# Patient Record
Sex: FEMALE | Race: WHITE | NOT HISPANIC OR LATINO | Employment: OTHER | URBAN - METROPOLITAN AREA
[De-identification: names, ages, dates, MRNs, and addresses within clinical notes are randomized per-mention and may not be internally consistent; named-entity substitution may affect disease eponyms.]

---

## 2017-06-01 ENCOUNTER — GENERIC CONVERSION - ENCOUNTER (OUTPATIENT)
Dept: OTHER | Facility: OTHER | Age: 82
End: 2017-06-01

## 2017-06-01 ENCOUNTER — APPOINTMENT (EMERGENCY)
Dept: RADIOLOGY | Facility: HOSPITAL | Age: 82
End: 2017-06-01
Payer: COMMERCIAL

## 2017-06-01 ENCOUNTER — HOSPITAL ENCOUNTER (EMERGENCY)
Facility: HOSPITAL | Age: 82
Discharge: HOME | End: 2017-06-01
Attending: EMERGENCY MEDICINE | Admitting: EMERGENCY MEDICINE
Payer: COMMERCIAL

## 2017-06-01 VITALS
HEIGHT: 65 IN | WEIGHT: 159.61 LBS | TEMPERATURE: 97.6 F | OXYGEN SATURATION: 96 % | DIASTOLIC BLOOD PRESSURE: 71 MMHG | HEART RATE: 80 BPM | RESPIRATION RATE: 14 BRPM | SYSTOLIC BLOOD PRESSURE: 149 MMHG | BODY MASS INDEX: 26.59 KG/M2

## 2017-06-01 DIAGNOSIS — R00.2 RAPID PALPITATIONS: Primary | ICD-10-CM

## 2017-06-01 DIAGNOSIS — R07.89 CHEST DISCOMFORT: ICD-10-CM

## 2017-06-01 LAB
ANION GAP SERPL CALCULATED.3IONS-SCNC: 8 MMOL/L (ref 4–13)
ATRIAL RATE: 88 BPM
BASOPHILS # BLD AUTO: 0.04 THOUSANDS/ΜL (ref 0–0.1)
BASOPHILS NFR BLD AUTO: 1 % (ref 0–1)
BUN SERPL-MCNC: 18 MG/DL (ref 5–25)
CALCIUM SERPL-MCNC: 10.5 MG/DL (ref 8.3–10.1)
CHLORIDE SERPL-SCNC: 107 MMOL/L (ref 100–108)
CLARITY, POC: CLEAR
CO2 SERPL-SCNC: 27 MMOL/L (ref 21–32)
COLOR, POC: YELLOW
CREAT SERPL-MCNC: 1.12 MG/DL (ref 0.6–1.3)
EOSINOPHIL # BLD AUTO: 0.1 THOUSAND/ΜL (ref 0–0.61)
EOSINOPHIL NFR BLD AUTO: 1 % (ref 0–6)
ERYTHROCYTE [DISTWIDTH] IN BLOOD BY AUTOMATED COUNT: 13.2 % (ref 11.6–15.1)
EXT BILIRUBIN, UA: NEGATIVE
EXT BLOOD URINE: NEGATIVE
EXT GLUCOSE, UA: NEGATIVE
EXT KETONES: NEGATIVE
EXT NITRITE, UA: NEGATIVE
EXT PH, UA: 6.5
EXT PROTEIN, UA: NEGATIVE
EXT SPECIFIC GRAVITY, UA: 1.01
EXT UROBILINOGEN: 0.2
GFR SERPL CREATININE-BSD FRML MDRD: 46.9 ML/MIN/1.73SQ M
GLUCOSE SERPL-MCNC: 99 MG/DL (ref 65–140)
HCT VFR BLD AUTO: 43.2 % (ref 34.8–46.1)
HGB BLD-MCNC: 14.9 G/DL (ref 11.5–15.4)
LYMPHOCYTES # BLD AUTO: 1.82 THOUSANDS/ΜL (ref 0.6–4.47)
LYMPHOCYTES NFR BLD AUTO: 24 % (ref 14–44)
MAGNESIUM SERPL-MCNC: 2.1 MG/DL (ref 1.6–2.6)
MCH RBC QN AUTO: 31.7 PG (ref 26.8–34.3)
MCHC RBC AUTO-ENTMCNC: 34.5 G/DL (ref 31.4–37.4)
MCV RBC AUTO: 92 FL (ref 82–98)
MONOCYTES # BLD AUTO: 0.41 THOUSAND/ΜL (ref 0.17–1.22)
MONOCYTES NFR BLD AUTO: 5 % (ref 4–12)
NEUTROPHILS # BLD AUTO: 5.21 THOUSANDS/ΜL (ref 1.85–7.62)
NEUTS SEG NFR BLD AUTO: 69 % (ref 43–75)
P AXIS: 61 DEGREES
PLATELET # BLD AUTO: 247 THOUSANDS/UL (ref 149–390)
PMV BLD AUTO: 10.5 FL (ref 8.9–12.7)
POTASSIUM SERPL-SCNC: 4.3 MMOL/L (ref 3.5–5.3)
PR INTERVAL: 156 MS
QRS AXIS: -40 DEGREES
QRSD INTERVAL: 76 MS
QT INTERVAL: 336 MS
QTC INTERVAL: 395 MS
RBC # BLD AUTO: 4.7 MILLION/UL (ref 3.81–5.12)
SODIUM SERPL-SCNC: 142 MMOL/L (ref 136–145)
T WAVE AXIS: 68 DEGREES
TROPONIN I SERPL-MCNC: <0.02 NG/ML
TROPONIN I SERPL-MCNC: <0.02 NG/ML
TSH SERPL DL<=0.05 MIU/L-ACNC: 1.59 UIU/ML (ref 0.36–3.74)
VENTRICULAR RATE: 83 BPM
WBC # BLD AUTO: 7.58 THOUSAND/UL (ref 4.31–10.16)
WBC # BLD EST: NEGATIVE 10*3/UL

## 2017-06-01 PROCEDURE — 96360 HYDRATION IV INFUSION INIT: CPT

## 2017-06-01 PROCEDURE — 93005 ELECTROCARDIOGRAM TRACING: CPT | Performed by: EMERGENCY MEDICINE

## 2017-06-01 PROCEDURE — 85025 COMPLETE CBC W/AUTO DIFF WBC: CPT | Performed by: EMERGENCY MEDICINE

## 2017-06-01 PROCEDURE — 84443 ASSAY THYROID STIM HORMONE: CPT | Performed by: EMERGENCY MEDICINE

## 2017-06-01 PROCEDURE — 71020 HB CHEST X-RAY 2VW FRONTAL&LATL: CPT

## 2017-06-01 PROCEDURE — 80048 BASIC METABOLIC PNL TOTAL CA: CPT | Performed by: EMERGENCY MEDICINE

## 2017-06-01 PROCEDURE — 81002 URINALYSIS NONAUTO W/O SCOPE: CPT | Performed by: EMERGENCY MEDICINE

## 2017-06-01 PROCEDURE — 36415 COLL VENOUS BLD VENIPUNCTURE: CPT | Performed by: EMERGENCY MEDICINE

## 2017-06-01 PROCEDURE — 83735 ASSAY OF MAGNESIUM: CPT | Performed by: EMERGENCY MEDICINE

## 2017-06-01 PROCEDURE — 96361 HYDRATE IV INFUSION ADD-ON: CPT

## 2017-06-01 PROCEDURE — 99285 EMERGENCY DEPT VISIT HI MDM: CPT

## 2017-06-01 PROCEDURE — 84484 ASSAY OF TROPONIN QUANT: CPT | Performed by: EMERGENCY MEDICINE

## 2017-06-01 PROCEDURE — 93005 ELECTROCARDIOGRAM TRACING: CPT

## 2017-06-01 RX ORDER — CARVEDILOL 3.12 MG/1
3.12 TABLET ORAL 2 TIMES DAILY WITH MEALS
COMMUNITY
End: 2017-12-21

## 2017-06-01 RX ORDER — DIPHENOXYLATE HYDROCHLORIDE AND ATROPINE SULFATE 2.5; .025 MG/1; MG/1
1 TABLET ORAL DAILY
COMMUNITY
End: 2017-12-21

## 2017-06-01 RX ORDER — LISINOPRIL 5 MG/1
5 TABLET ORAL DAILY
COMMUNITY

## 2017-06-01 RX ADMIN — SODIUM CHLORIDE 1000 ML: 0.9 INJECTION, SOLUTION INTRAVENOUS at 16:51

## 2017-06-01 RX ADMIN — SODIUM CHLORIDE 1000 ML: 0.9 INJECTION, SOLUTION INTRAVENOUS at 15:30

## 2017-06-02 LAB
ATRIAL RATE: 70 BPM
ATRIAL RATE: 72 BPM
P AXIS: 60 DEGREES
P AXIS: 66 DEGREES
PR INTERVAL: 160 MS
PR INTERVAL: 176 MS
QRS AXIS: -27 DEGREES
QRS AXIS: -28 DEGREES
QRSD INTERVAL: 74 MS
QRSD INTERVAL: 76 MS
QT INTERVAL: 370 MS
QT INTERVAL: 370 MS
QTC INTERVAL: 399 MS
QTC INTERVAL: 405 MS
T WAVE AXIS: 40 DEGREES
T WAVE AXIS: 42 DEGREES
VENTRICULAR RATE: 70 BPM
VENTRICULAR RATE: 72 BPM

## 2017-06-05 ENCOUNTER — HOSPITAL ENCOUNTER (OUTPATIENT)
Dept: NUCLEAR MEDICINE | Facility: HOSPITAL | Age: 82
Discharge: HOME | End: 2017-06-05
Attending: EMERGENCY MEDICINE
Payer: COMMERCIAL

## 2017-06-05 ENCOUNTER — TRANSCRIBE ORDERS (OUTPATIENT)
Dept: ADMINISTRATIVE | Facility: HOSPITAL | Age: 82
End: 2017-06-05

## 2017-06-05 ENCOUNTER — HOSPITAL ENCOUNTER (OUTPATIENT)
Dept: NON INVASIVE DIAGNOSTICS | Facility: HOSPITAL | Age: 82
Discharge: HOME | End: 2017-06-05
Attending: EMERGENCY MEDICINE
Payer: COMMERCIAL

## 2017-06-05 DIAGNOSIS — R00.2 RAPID PALPITATIONS: ICD-10-CM

## 2017-06-05 DIAGNOSIS — R07.89 CHEST DISCOMFORT: ICD-10-CM

## 2017-06-05 LAB
CHEST PAIN STATEMENT: NORMAL
MAX DIASTOLIC BP: 84 MMHG
MAX HEART RATE: 181 BPM
MAX PREDICTED HEART RATE: 141 BPM
MAX. SYSTOLIC BP: 184 MMHG
PROTOCOL NAME: NORMAL
TARGET HR FORMULA: NORMAL
TEST INDICATION: NORMAL
TIME IN EXERCISE PHASE: 206 S

## 2017-06-05 PROCEDURE — 93226 XTRNL ECG REC<48 HR SCAN A/R: CPT

## 2017-06-05 PROCEDURE — 78452 HT MUSCLE IMAGE SPECT MULT: CPT

## 2017-06-05 PROCEDURE — A9502 TC99M TETROFOSMIN: HCPCS

## 2017-06-05 PROCEDURE — 93225 XTRNL ECG REC<48 HRS REC: CPT

## 2017-06-05 PROCEDURE — 93017 CV STRESS TEST TRACING ONLY: CPT

## 2017-06-09 ENCOUNTER — ALLSCRIPTS OFFICE VISIT (OUTPATIENT)
Dept: OTHER | Facility: OTHER | Age: 82
End: 2017-06-09

## 2017-07-18 ENCOUNTER — HOSPITAL ENCOUNTER (OUTPATIENT)
Dept: RADIOLOGY | Facility: HOSPITAL | Age: 82
Discharge: HOME | End: 2017-07-18
Attending: ORTHOPAEDIC SURGERY
Payer: COMMERCIAL

## 2017-07-18 ENCOUNTER — ALLSCRIPTS OFFICE VISIT (OUTPATIENT)
Dept: OTHER | Facility: OTHER | Age: 82
End: 2017-07-18

## 2017-07-18 DIAGNOSIS — M25.562 PAIN IN LEFT KNEE: ICD-10-CM

## 2017-07-18 PROCEDURE — 73562 X-RAY EXAM OF KNEE 3: CPT

## 2017-10-06 ENCOUNTER — GENERIC CONVERSION - ENCOUNTER (OUTPATIENT)
Dept: OTHER | Facility: OTHER | Age: 82
End: 2017-10-06

## 2017-10-17 ENCOUNTER — GENERIC CONVERSION - ENCOUNTER (OUTPATIENT)
Dept: OTHER | Facility: OTHER | Age: 82
End: 2017-10-17

## 2017-12-21 ENCOUNTER — APPOINTMENT (EMERGENCY)
Dept: RADIOLOGY | Facility: HOSPITAL | Age: 82
End: 2017-12-21
Payer: COMMERCIAL

## 2017-12-21 ENCOUNTER — HOSPITAL ENCOUNTER (EMERGENCY)
Facility: HOSPITAL | Age: 82
Discharge: HOME | End: 2017-12-21
Attending: EMERGENCY MEDICINE
Payer: COMMERCIAL

## 2017-12-21 VITALS
HEART RATE: 94 BPM | DIASTOLIC BLOOD PRESSURE: 85 MMHG | WEIGHT: 161.4 LBS | BODY MASS INDEX: 26.86 KG/M2 | RESPIRATION RATE: 20 BRPM | OXYGEN SATURATION: 97 % | TEMPERATURE: 98.6 F | SYSTOLIC BLOOD PRESSURE: 156 MMHG

## 2017-12-21 DIAGNOSIS — M25.569 CHRONIC KNEE PAIN: Primary | ICD-10-CM

## 2017-12-21 DIAGNOSIS — G89.29 CHRONIC KNEE PAIN: Primary | ICD-10-CM

## 2017-12-21 PROCEDURE — 73564 X-RAY EXAM KNEE 4 OR MORE: CPT

## 2017-12-21 PROCEDURE — 99283 EMERGENCY DEPT VISIT LOW MDM: CPT

## 2017-12-21 RX ORDER — HYDROCODONE BITARTRATE AND ACETAMINOPHEN 5; 325 MG/1; MG/1
1 TABLET ORAL EVERY 6 HOURS PRN
Qty: 15 TABLET | Refills: 0 | Status: SHIPPED | OUTPATIENT
Start: 2017-12-21 | End: 2018-04-11 | Stop reason: ALTCHOICE

## 2017-12-21 RX ORDER — METOPROLOL SUCCINATE 25 MG/1
1 TABLET, EXTENDED RELEASE ORAL DAILY
COMMUNITY
Start: 2017-06-09 | End: 2018-06-03 | Stop reason: SDUPTHER

## 2017-12-21 RX ORDER — HYDROCODONE BITARTRATE AND ACETAMINOPHEN 5; 325 MG/1; MG/1
1 TABLET ORAL ONCE
Status: DISCONTINUED | OUTPATIENT
Start: 2017-12-21 | End: 2017-12-21 | Stop reason: HOSPADM

## 2017-12-21 NOTE — ED PROVIDER NOTES
History  Chief Complaint   Patient presents with    Knee Pain     c/o left knee pain/swelling s/p "my knee just went out while I was walking" x 1 day  Pt recieved Cortisone Shot in left knee 17  Patient presents to the emergency department for evaluation of left knee pain which is chronic in nature  She knows that she ultimately will need a new knee replacement 1 day but has not obtained this  She has been getting cortisone injections her last was   Yesterday while walking in the house she tweaked her knee but did not fall to the ground  She has had an exacerbation of discomfort the anterior portion  She is having difficulty bearing weight  She denies any other injury at this time  She denies having taken analgesics  Prior to Admission Medications   Prescriptions Last Dose Informant Patient Reported? Taking?   lisinopril (ZESTRIL) 5 mg tablet   Yes Yes   Sig: Take 5 mg by mouth daily     metoprolol succinate (TOPROL-XL) 25 mg 24 hr tablet   Yes Yes   Sig: Take 1 tablet by mouth daily      Facility-Administered Medications: None       Past Medical History:   Diagnosis Date    Cardiac disease     Hypertension     MI, old        Past Surgical History:   Procedure Laterality Date     SECTION      TONSILLECTOMY      TOTAL KNEE ARTHROPLASTY Right        History reviewed  No pertinent family history  I have reviewed and agree with the history as documented  Social History   Substance Use Topics    Smoking status: Never Smoker    Smokeless tobacco: Never Used    Alcohol use No        Review of Systems   Constitutional: Negative  HENT: Negative  Eyes: Negative  Respiratory: Negative  Cardiovascular: Negative  Gastrointestinal: Negative  Endocrine: Negative  Genitourinary: Negative  Musculoskeletal: Positive for arthralgias and gait problem  Skin: Negative  Allergic/Immunologic: Negative  Hematological: Negative  Psychiatric/Behavioral: Negative  Physical Exam  ED Triage Vitals [12/21/17 0945]   Temperature Pulse Respirations Blood Pressure SpO2   98 6 °F (37 °C) 94 20 156/85 97 %      Temp Source Heart Rate Source Patient Position - Orthostatic VS BP Location FiO2 (%)   Oral Monitor Sitting Left arm --      Pain Score       7           Orthostatic Vital Signs  Vitals:    12/21/17 0945   BP: 156/85   Pulse: 94   Patient Position - Orthostatic VS: Sitting       Physical Exam   Constitutional: She is oriented to person, place, and time  She appears well-developed and well-nourished  Musculoskeletal: She exhibits tenderness  She exhibits no edema or deformity  Grossly normal appearing knee with mild swelling  No obvious deformity  No joint laxity or obvious joint effusion  Patient has normal range of motion but is decreased in speed  Neurovascular status intact  There is tenderness to the anterior portion of the knee  Neurological: She is alert and oriented to person, place, and time  She displays normal reflexes  No cranial nerve deficit or sensory deficit  She exhibits normal muscle tone  Coordination normal    Skin: Skin is warm and dry  No rash noted  Psychiatric: She has a normal mood and affect  Her behavior is normal  Judgment and thought content normal    Nursing note and vitals reviewed  ED Medications  Medications   HYDROcodone-acetaminophen (NORCO) 5-325 mg per tablet 1 tablet (1 tablet Oral Not Given 12/21/17 1029)       Diagnostic Studies  Results Reviewed     None                 XR knee 4+ views left injury   Final Result by Emily Rowe MD (12/21 1026)      Chondrocalcinosis and left knee arthritis  Findings suggest CPPD arthropathy  Trace joint effusion    No acute osseous abnormality         Workstation performed: [id number]                    Procedures  Procedures       Phone Contacts  ED Phone Contact    ED Course  ED Course as of Dec 21 1034   Thu Dec 21, 2017   1026 Patient is stable for discharge without acute pathology or fracture in the knee  Will fit her with a mobilizer at a walker and refer to her private orthopedist   Vicodin given as well as a script  [facility]  CritCare Time    Disposition  Final diagnoses:   Chronic knee pain     Time reflects when diagnosis was documented in both MDM as applicable and the Disposition within this note     Time User Action Codes Description Comment    12/21/2017 10:27 AM Ryan Kathleen Sola [[id number],  G89 29] Chronic knee pain       ED Disposition     ED Disposition Condition Comment    Discharge  Vane Dioxn discharge to home/self care  Condition at discharge: Stable        Follow-up Information     Follow up With Specialties Details Why Contact Info    Private Orthopedist  Schedule an appointment as soon as possible for a visit          Patient's Medications   Discharge Prescriptions    HYDROCODONE-ACETAMINOPHEN (NORCO) 5-325 MG PER TABLET    Take 1 tablet by mouth every 6 (six) hours as needed for pain Max Daily Amount: 4 tablets       Start Date: 12/21/2017End Date: --       Order Dose: 1 tablet       Quantity: 15 tablet    Refills: 0     No discharge procedures on file      ED Provider  Electronically Signed by           Shelia Macdonald MD  12/21/17 2711

## 2017-12-21 NOTE — DISCHARGE INSTRUCTIONS
Arthralgia   WHAT YOU NEED TO KNOW:   Arthralgia is pain in one or more joints, with no inflammation  It may be short-term and get better within 6 to 8 weeks  Arthralgia can be an early sign of arthritis  Arthralgia may be caused by a medical condition, such as a hormone disorder or a tumor  It may also be caused by an infection or injury  DISCHARGE INSTRUCTIONS:   Medicines: The following medicines may  be ordered for you:  · Acetaminophen  decreases pain  Ask how much to take and how often to take it  Follow directions  Acetaminophen can cause liver damage if not taken correctly  · NSAIDs  decrease pain and prevent swelling  Ask your healthcare provider which medicine is right for you  Ask how much to take and when to take it  Take as directed  NSAIDs can cause stomach bleeding and kidney problems if not taken correctly  · Pain relief cream  decreases pain  Use this cream as directed  · Take your medicine as directed  Contact your healthcare provider if you think your medicine is not helping or if you have side effects  Tell him of her if you are allergic to any medicine  Keep a list of the medicines, vitamins, and herbs you take  Include the amounts, and when and why you take them  Bring the list or the pill bottles to follow-up visits  Carry your medicine list with you in case of an emergency  Follow up with your healthcare provider or specialist as directed:  Write down your questions so you remember to ask them during your visits  Self-care:   · Apply heat  to help decrease pain  Use a heating pad or heat wrap  Apply heat for 20 to 30 minutes every 2 hours for as many days as directed  · Rest  as much as possible  Avoid activities that cause joint pain  · Apply ice  to help decrease swelling and pain  Ice may also help prevent tissue damage  Use an ice pack, or put crushed ice in a plastic bag   Cover it with a towel and place it on your painful joint for 15 to 20 minutes every hour or as directed  · Support  the joint with a brace or elastic wrap as directed  · Elevate  your joint above the level of your heart as often as you can to help decrease swelling and pain  Prop your painful joint on pillows or blankets to keep it elevated comfortably  · Lose weight  if you are overweight  Extra weight can put pressure on your joints and cause more pain  Ask your healthcare provider how much you should weigh  Ask him to help you create a weight loss plan  · Exercise  regularly to help improve joint movement and to decrease pain  Ask about the best exercise plan for you  Low-impact exercises can help take the pressure off your joints  Examples are walking, swimming, and water aerobics  Physical therapy:  A physical therapist teaches you exercises to help improve movement and strength, and to decrease pain  Ask your healthcare provider if physical therapy is right for you  Contact your healthcare provider or specialist if:   · You have a fever  · You continue to have joint pain that cannot be relieved with heat, ice, or medicine  · You have pain and inflammation around your joint  · You have questions or concerns about your condition or care  Return to the emergency department if:   · You have sudden, severe pain when you move your joint  · You have a fever and shaking chills  · You cannot move your joint  · You lose feeling on the side of your body where you have the painful joint  © 2017 [address]  Information is for End User's use only and may not be sold, redistributed or otherwise used for commercial purposes  All illustrations and images included in CareNotes® are the copyrighted property of [employer]  or [employer]  The above information is an  only  It is not intended as medical advice for individual conditions or treatments   Talk to your doctor, nurse or pharmacist before following any medical regimen to see if it is safe and effective for you  Knee Pain   WHAT YOU NEED TO KNOW:   Knee pain may start suddenly, or it may be a long-term problem  You may have pain on the side, front, or back of your knee  You may have knee stiffness and swelling  You may hear popping sounds or feel like your knee is giving way or locking up as you walk  You may feel pain when you sit, stand, walk, or climb up and down stairs  Knee pain can be caused by conditions such as obesity, inflammation, or strains or tears in ligaments or tendons  DISCHARGE INSTRUCTIONS:   Follow up with your healthcare provider within 24 hours or as directed: You may need follow-up treatments, such as steroid injections to decrease pain  Write down your questions so you remember to ask them during your visits  Self-care:   · Rest  your knee so it can heal  Limit activities that increase your pain  · Ice  can help reduce swelling  Wrap ice in a towel and put it on your knee for as long and as often as directed  · Compression  with a brace or bandage can help reduce swelling  Use a brace or bandage only as directed  · Elevation  helps decrease pain and swelling  Elevate your knee while you are sitting or lying down  Prop your leg on pillows to keep your knee above the level of your heart  Medicines:   · NSAIDs  help decrease swelling and pain or fever  This medicine is available with or without a doctor's order  NSAIDs can cause stomach bleeding or kidney problems in certain people  If you take blood thinner medicine, always ask your healthcare provider if NSAIDs are safe for you  Always read the medicine label and follow directions  · Acetaminophen  decreases pain and fever  It is available without a doctor's order  Ask how much to take and when to take it  Follow directions  Acetaminophen can cause liver damage if not taken correctly  · Take your medicine as directed    Contact your healthcare provider if you think your medicine is not helping or if you have side effects  Tell him or her if you are allergic to any medicine  Keep a list of the medicines, vitamins, and herbs you take  Include the amounts, and when and why you take them  Bring the list or the pill bottles to follow-up visits  Carry your medicine list with you in case of an emergency  Exercise as directed: You may need to see a physical therapist or do recommended exercises to improve movement and decrease your pain  You may be directed to walk, swim, or ride a bike  Follow your exercise plan exactly as directed to avoid further injury  Contact your healthcare provider if:   · You have questions or concerns about your condition or care  Return to the emergency department if:   · Your pain is worse, even after treatment  · You cannot bend or straighten your leg completely  · The swelling around your knee does not go down even with treatment  · Your knee is painful and hot to the touch  © 2017 [address] Information is for End User's use only and may not be sold, redistributed or otherwise used for commercial purposes  All illustrations and images included in CareNotes® are the copyrighted property of [employer]  or [employer]  The above information is an  only  It is not intended as medical advice for individual conditions or treatments  Talk to your doctor, nurse or pharmacist before following any medical regimen to see if it is safe and effective for you  Chronic Pain   WHAT YOU NEED TO KNOW:   Chronic pain is pain that does not get better for 3 months or longer  Chronic pain may hurt all the time, or come and go  DISCHARGE INSTRUCTIONS:   Call 911 or have someone call 911 for any of the following:   · You are breathing slower than normal, or you have trouble breathing  · You cannot be awakened  · You have a seizure    Return to the emergency department if:   · Your heart is beating slower than normal     · Your heart feels like it is jumping or fluttering  · You cannot think clearly  Contact your healthcare provider if:   · You have side effects from prescription pain medicine, such as itching, nausea, or vomiting  · You have trouble sleeping  · Your pain gets worse, even after you take medicine  · You don't think the medicine is working  · You have questions or concerns about your condition or care  Medicines: You may need any of the following:  · Acetaminophen  decreases pain  It is available without a doctor's order  Ask how much to take and how often to take it  Follow directions  Read the labels of all other medicines you are using to see if they also contain acetaminophen, or ask your doctor or pharmacist  Acetaminophen can cause liver damage if not taken correctly  Do not use more than 4 grams (4,000 milligrams) total of acetaminophen in one day  · NSAIDs , such as ibuprofen, help decrease swelling, pain, and fever  This medicine is available with or without a doctor's order  NSAIDs can cause stomach bleeding or kidney problems in certain people  If you take blood thinner medicine, always ask your healthcare provider if NSAIDs are safe for you  Always read the medicine label and follow directions  · Prescription pain medicine  called narcotics or opioids may be given  Ask your healthcare provider how to take this medicine safely  · Anesthetics  can be rubbed on your skin or injected into a nerve or muscle to numb an area  · Other medicines  may reduce pain, anxiety, muscle tension, or swelling  · Take your medicine as directed  Contact your healthcare provider if you think your medicine is not helping or if you have side effects  Tell him of her if you are allergic to any medicine  Keep a list of the medicines, vitamins, and herbs you take  Include the amounts, and when and why you take them  Bring the list or the pill bottles to follow-up visits   Carry your medicine list with you in case of an emergency  Manage your chronic pain:   · Apply heat  on the area in pain for 20 to 30 minutes every 2 hours for as many days as directed  Heat helps decrease pain and muscle spasms  · Apply ice  on the part of your body that hurts for 15 to 20 minutes every hour or as directed  Use an ice pack, or put crushed ice in a plastic bag  Cover it with a towel  Ice decreases pain and swelling, and helps prevent tissue damage  · Go to physical therapy as directed  A physical therapist teaches you exercises to help improve movement and strength, and to decrease pain  · Exercise for 30 minutes, 3 times a week  Regular physical activity can help decrease pain and improve your quality of life  Ask your healthcare provider about the best exercise plan for your type of pain  · Get enough sleep  Create a relaxing bedtime routine  Go to sleep and wake up at the same time every day  Avoid caffeine in the afternoon  · Talk with a counselor or therapist   A type of counseling called cognitive behavioral therapy (CBT) can help your chronic pain by changing the way you think about it  CBT can also improve your mood, sleep, and ability to move  What you must know if you take narcotic pain medicine:   · You may need to take a bowel movement softener  The most common side effect of prescription pain medicine is constipation  Bowel movement softeners are available over the counter  · Do not mix prescription pain medicines  This can cause an overdose of medicine, which can become life-threatening  Read labels  Make sure you know the ingredients in all of your medicines  · Do not drink alcohol  when you take prescription pain medicine  It is not safe to mix narcotics or opioids with alcohol or illegal drugs  · Prescription pain medicine may impair your ability to drive or work safely  They may also cause dizziness and increase your risk for falling       · Store prescription pain medicine in a safe location at home  Keep your medicine away from children and other people  Never share your medicine with anyone  Follow up with your healthcare provider as directed: You may be referred to a pain specialist  Write down your questions so you remember to ask them during your visits  © 2017 [address] Information is for End User's use only and may not be sold, redistributed or otherwise used for commercial purposes  All illustrations and images included in CareNotes® are the copyrighted property of [employer]  or [employer]  The above information is an  only  It is not intended as medical advice for individual conditions or treatments  Talk to your doctor, nurse or pharmacist before following any medical regimen to see if it is safe and effective for you

## 2018-01-08 ENCOUNTER — ALLSCRIPTS OFFICE VISIT (OUTPATIENT)
Dept: OTHER | Facility: OTHER | Age: 83
End: 2018-01-08

## 2018-01-09 NOTE — PROGRESS NOTES
Assessment   Assessed    1  PSVT (paroxysmal supraventricular tachycardia) (427 0) (I47 1)   2  Arthritis of left knee (716 96) (M17 12)    Plan   Arthritis of left knee    · Follow-up visit in 1 year Evaluation and Treatment  Follow-up  Status: Hold For -    Scheduling  Requested for: [id number]   Ordered; For: Arthritis of left knee; Ordered By: Ankit Lovell Performed:  Due: [id number]    Discussion/Summary   Cardiology Discussion Summary Free Text Note Form [facility]:    82F history of stress induced CMP  EF normalized  Off lisinopril    in the past  On metoprolol and stable  no contraindication to doing orthopedic surgery  doesnât have a date or any firm plans yet, just wanted to make sure it was ok  need to return if clearance necessary  review EKG if done preop testing  beta blocker  up 1 year  Chief Complaint   Chief Complaint Free Text Note Form: Patient is here for a follow up and would like to know if i she is in a stable condition to have left knee replacement  History of Present Illness   Cardiology HPI Free Text Note Form [facility]: [de-identified]year old female  She had symptoms of palpitations, had PSVT noted on stress test  monitor 24 hours without significant arrhythmia  of stress induced CMP in setting of emotional stress  EF normalize  d was running low, so lisinopril was stopped  recent palpitations  to have knee surgery  Wanted to make sure it was ok with regards to cardiac standpoint before scheduling  palpitations recently, no chest pain  Review of Systems   Cardiology Female ROS:         Cardiac: palpitations present , but-- no chest pain  Skin: No complaints of nonhealing sores or skin rash  Genitourinary: No complaints of recurrent urinary tract infections, frequent urination at night, difficult urination, blood in urine, kidney stones, loss of bladder control, kidney problems, denies any birth control or hormone replacement, is not post menopausal, not currently pregnant  Psychological: No complaints of feeling depressed, anxiety, panic attacks, or difficulty concentrating  General: No complaints of trouble sleeping, lack of energy, fatigue, appetite changes, weight changes, fever, frequent infections, or night sweats  Respiratory: No complaints of shortness of breath, cough with sputum, or wheezing  HEENT: No complaints of serious problems, hearing problems, nose problems, throat problems, or snoring  Gastrointestinal: No complaints of liver problems, nausea, vomiting, heartburn, constipation, bloody stools, diarrhea, problems swallowing, adbominal pain, or rectal bleeding  Hematologic: No complaints of bleeding disorders, anemia, blood clots, or excessive brusing  Neurological: No complaints of numbness, tingling, dizziness, weakness, seizures, headaches, syncope or fainting, AM fatigue, daytime sleepiness, no witnessed apnea episodes  Musculoskeletal: No complaints of arthritis, back pain, or painfull swelling  ROS Reviewed:    ROS reviewed  Active Problems   Problems    1  Arthritis of left knee (716 96) (M17 12)   2  Chest discomfort (786 59) (R07 89)   3  Hypercholesterolemia (272 0) (E78 00)   4  Left knee pain (719 46) (M25 562)   5  Palpitations (785 1) (R00 2)   6  PSVT (paroxysmal supraventricular tachycardia) (427 0) (I47 1)    Past Medical History   Problems    1  History of cardiac cath (V45 89) (Y56 012)   2  History of chest pain (V13 89) (Z87 898)   3  History of myocardial infarction (412) (I25 2)  Active Problems And Past Medical History Reviewed: The active problems and past medical history were reviewed and updated today  Family History   Mother    1  No pertinent family history  Father    2  No pertinent family history  Family History Reviewed: The family history was reviewed and updated today  Social History   Problems    · Non-smoker (V49 89) (Z78 9)  Social History Reviewed:  The social history was reviewed and is unchanged  Current Meds    1  Adult Aspirin Low Strength 81 MG TBDP; Therapy: (Recorded:09Jun2017) to Recorded   2  Daily Multi Oral Tablet; Therapy: (Recorded:09Jun2017) to Recorded   3  Metoprolol Succinate ER 25 MG Oral Tablet Extended Release 24 Hour; TAKE 1 TABLET     ONCE DAILY; Therapy: [id number] to (Evaluate:04Jun2018)  Requested for: [id number]; Last     Rx:09Jun2017 Ordered  Medication List Reviewed: The medication list was reviewed and updated today  Allergies   Medication    1  No Known Drug Allergies    Vitals   Vital Signs    Recorded: [id number] 01:04PM   Heart Rate 93   Systolic 684, LUE, Sitting   Diastolic 84, LUE, Sitting   Height 5 ft 5 in   Weight 161 lb    BMI Calculated 26 79   BSA Calculated 1 8   O2 Saturation 97     Physical Exam        Constitutional - General appearance: No acute distress, well appearing and well nourished  Eyes - Conjunctiva and Sclera examination: Conjunctiva pink, sclera anicteric  Neck - Normal, no JVD   Pulmonary - Respiratory effort: No signs of respiratory distress  -- Auscultation of lungs: Clear to auscultation  Cardiovascular - Auscultation of heart: Normal rate and rhythm, normal S1 and S2, no murmurs  -- Pedal pulses: Normal, 2+ bilaterally  -- Examination of extremities for edema and/or varicosities: Normal        Abdomen - Soft  Musculoskeletal - Gait and station: Normal gait  Skin - Skin: Normal without rashes  Skin is warm and well perfused  Neurologic - Speech normal  No focal deficits  Psychiatric - Orientation to person, place, and time: Normal -- Mood and affect: Normal       Future Appointments      Date/Time Provider Specialty Site   01/16/2018 09:15 AM DEWEY Flores  Orthopedic Surgery [facility] [place]   03/07/2018 03:00 PM DEWEY Flores   Orthopedic Surgery [address]     Signatures    Electronically signed by : DEWEY Wu ; Jan 8 2018  1:35PM EST                       (Author)

## 2018-01-13 VITALS
BODY MASS INDEX: 26.51 KG/M2 | HEIGHT: 65 IN | HEART RATE: 79 BPM | WEIGHT: 159.13 LBS | DIASTOLIC BLOOD PRESSURE: 76 MMHG | SYSTOLIC BLOOD PRESSURE: 120 MMHG

## 2018-01-14 VITALS
SYSTOLIC BLOOD PRESSURE: 140 MMHG | DIASTOLIC BLOOD PRESSURE: 74 MMHG | BODY MASS INDEX: 26.49 KG/M2 | HEIGHT: 65 IN | HEART RATE: 79 BPM | WEIGHT: 159 LBS

## 2018-01-16 ENCOUNTER — GENERIC CONVERSION - ENCOUNTER (OUTPATIENT)
Dept: OTHER | Facility: OTHER | Age: 83
End: 2018-01-16

## 2018-01-22 VITALS
SYSTOLIC BLOOD PRESSURE: 133 MMHG | HEART RATE: 87 BPM | DIASTOLIC BLOOD PRESSURE: 77 MMHG | WEIGHT: 160 LBS | HEIGHT: 65 IN | BODY MASS INDEX: 26.66 KG/M2

## 2018-01-22 VITALS
BODY MASS INDEX: 26.54 KG/M2 | DIASTOLIC BLOOD PRESSURE: 66 MMHG | OXYGEN SATURATION: 97 % | WEIGHT: 159.31 LBS | HEART RATE: 92 BPM | HEIGHT: 65 IN | SYSTOLIC BLOOD PRESSURE: 102 MMHG

## 2018-01-22 VITALS
SYSTOLIC BLOOD PRESSURE: 138 MMHG | BODY MASS INDEX: 26.82 KG/M2 | OXYGEN SATURATION: 97 % | DIASTOLIC BLOOD PRESSURE: 84 MMHG | HEIGHT: 65 IN | HEART RATE: 93 BPM | WEIGHT: 161 LBS

## 2018-01-24 VITALS
SYSTOLIC BLOOD PRESSURE: 137 MMHG | HEART RATE: 83 BPM | DIASTOLIC BLOOD PRESSURE: 81 MMHG | HEIGHT: 65 IN | WEIGHT: 162.25 LBS | BODY MASS INDEX: 27.03 KG/M2

## 2018-01-30 ENCOUNTER — OFFICE VISIT (OUTPATIENT)
Dept: OBGYN CLINIC | Facility: HOSPITAL | Age: 83
End: 2018-01-30
Payer: COMMERCIAL

## 2018-01-30 VITALS
HEART RATE: 73 BPM | HEIGHT: 64 IN | DIASTOLIC BLOOD PRESSURE: 83 MMHG | WEIGHT: 161 LBS | SYSTOLIC BLOOD PRESSURE: 128 MMHG | BODY MASS INDEX: 27.49 KG/M2

## 2018-01-30 DIAGNOSIS — M17.12 PRIMARY OSTEOARTHRITIS OF LEFT KNEE: Primary | ICD-10-CM

## 2018-01-30 PROCEDURE — 99213 OFFICE O/P EST LOW 20 MIN: CPT | Performed by: ORTHOPAEDIC SURGERY

## 2018-01-30 NOTE — PROGRESS NOTES
82 y o female for follow-up  She has had no relief following the injection of corticosteroid to a left knee that has bicompartmental arthritis  She describes significant pain level left knee joint, the pain is made worse bearing weight, the pain worsens with increased activities  She desires resolution of this pain, as her level of pain precludes activities daily living    Review of Systems  Review of systems negative unless otherwise specified in HPI    Past Medical History  Past Medical History:   Diagnosis Date    Cardiac disease     Hypertension     MI, old        Past Surgical History  Past Surgical History:   Procedure Laterality Date     SECTION      TONSILLECTOMY      TOTAL KNEE ARTHROPLASTY Right        Current Medications  Current Outpatient Prescriptions on File Prior to Visit   Medication Sig Dispense Refill    lisinopril (ZESTRIL) 5 mg tablet Take 5 mg by mouth daily        metoprolol succinate (TOPROL-XL) 25 mg 24 hr tablet Take 1 tablet by mouth daily      HYDROcodone-acetaminophen (NORCO) 5-325 mg per tablet Take 1 tablet by mouth every 6 (six) hours as needed for pain Max Daily Amount: 4 tablets 15 tablet 0     No current facility-administered medications on file prior to visit  Recent Labs [facility])  @LABALLVALUEIP(HCT:3,HGB:3,PT,INR,WBC:3,ESR,CRP,GLUCOSE,HGBA1C)@      Physical exam  Gait pattern has antalgia  Breathing is nonlabored  Left hip was well  Left thighs devoid of atrophy left knee is in varus  There is bony enlargement tendons medially  There is crepitation flexion extension  There is no palpable warmth of the synovium  Calf compartments are soft supple  Toes are warm, sensate, mobile  Imaging  Review of previous x-rays shows medial patellofemoral compartment arthritis in the left knee    Procedure  None performed    Assessment/Plan:   [de-identified] y  o female with left knee pain and dysfunction stemming from osteoarthritis    She has been through a comprehensive nonsurgical treatment plan, and as result, I think elective left total knee replacement surgery is indicated  After review of the risks and benefits, consent was that the above-mentioned surgery  No guarantees given    I would welcome the opportunity see back in the office a postoperative patient

## 2018-02-16 DIAGNOSIS — M17.12 PRIMARY OSTEOARTHRITIS OF LEFT KNEE: Primary | ICD-10-CM

## 2018-03-02 DIAGNOSIS — M17.12 PRIMARY OSTEOARTHRITIS OF LEFT KNEE: Primary | ICD-10-CM

## 2018-03-08 ENCOUNTER — TELEPHONE (OUTPATIENT)
Dept: CARDIOLOGY CLINIC | Facility: CLINIC | Age: 83
End: 2018-03-08

## 2018-03-08 NOTE — TELEPHONE ENCOUNTER
My last [employer] note indicates clearance, this should be sufficient  Please send the note to orthopedic surgeon  **Please note due to formatting changes with note crossing over to in EPIC, there are key words missing, so send the note as a fax from [employer]  The patient would be at an acceptable risk for knee replacement  No testing or changes to medications needed

## 2018-04-11 ENCOUNTER — APPOINTMENT (OUTPATIENT)
Dept: LAB | Facility: HOSPITAL | Age: 83
End: 2018-04-11
Attending: ORTHOPAEDIC SURGERY
Payer: COMMERCIAL

## 2018-04-11 ENCOUNTER — HOSPITAL ENCOUNTER (OUTPATIENT)
Dept: RADIOLOGY | Facility: HOSPITAL | Age: 83
Discharge: HOME | End: 2018-04-11
Payer: COMMERCIAL

## 2018-04-11 DIAGNOSIS — M17.12 PRIMARY OSTEOARTHRITIS OF LEFT KNEE: ICD-10-CM

## 2018-04-11 LAB
ABO GROUP BLD: NORMAL
ANION GAP SERPL CALCULATED.3IONS-SCNC: 5 MMOL/L (ref 4–13)
APTT PPP: 29 SECONDS (ref 23–35)
ATRIAL RATE: 77 BPM
BACTERIA UR QL AUTO: NORMAL /HPF
BASOPHILS # BLD AUTO: 0.03 THOUSANDS/ΜL (ref 0–0.1)
BASOPHILS NFR BLD AUTO: 1 % (ref 0–1)
BILIRUB UR QL STRIP: NEGATIVE
BLD GP AB SCN SERPL QL: NEGATIVE
BUN SERPL-MCNC: 17 MG/DL (ref 5–25)
CALCIUM SERPL-MCNC: 9.5 MG/DL
CHLORIDE SERPL-SCNC: 109 MMOL/L (ref 100–108)
CLARITY UR: CLEAR
CO2 SERPL-SCNC: 26 MMOL/L (ref 21–32)
COLOR UR: YELLOW
CREAT SERPL-MCNC: 1.01 MG/DL (ref 0.6–1.3)
EOSINOPHIL # BLD AUTO: 0.07 THOUSAND/ΜL (ref 0–0.61)
EOSINOPHIL NFR BLD AUTO: 1 % (ref 0–6)
ERYTHROCYTE [DISTWIDTH] IN BLOOD BY AUTOMATED COUNT: 13.1 % (ref 11.6–15.1)
EST. AVERAGE GLUCOSE BLD GHB EST-MCNC: 108 MG/DL
GFR SERPL CREATININE-BSD FRML MDRD: 53 ML/MIN/1.73SQ M
GLUCOSE P FAST SERPL-MCNC: 82 MG/DL (ref 65–99)
GLUCOSE UR STRIP-MCNC: NEGATIVE MG/DL
HBA1C MFR BLD: 5.4 % (ref 4.2–6.3)
HCT VFR BLD AUTO: 41.5 % (ref 34.8–46.1)
HGB BLD-MCNC: 14 G/DL (ref 11.5–15.4)
HGB UR QL STRIP.AUTO: ABNORMAL
HYALINE CASTS #/AREA URNS LPF: NORMAL /LPF
INR PPP: 0.99 (ref 0.86–1.16)
KETONES UR STRIP-MCNC: NEGATIVE MG/DL
LEUKOCYTE ESTERASE UR QL STRIP: NEGATIVE
LYMPHOCYTES # BLD AUTO: 2.03 THOUSANDS/ΜL (ref 0.6–4.47)
LYMPHOCYTES NFR BLD AUTO: 33 % (ref 14–44)
MCH RBC QN AUTO: 31.4 PG (ref 26.8–34.3)
MCHC RBC AUTO-ENTMCNC: 33.7 G/DL (ref 31.4–37.4)
MCV RBC AUTO: 93 FL (ref 82–98)
MONOCYTES # BLD AUTO: 0.5 THOUSAND/ΜL (ref 0.17–1.22)
MONOCYTES NFR BLD AUTO: 8 % (ref 4–12)
NEUTROPHILS # BLD AUTO: 3.56 THOUSANDS/ΜL (ref 1.85–7.62)
NEUTS SEG NFR BLD AUTO: 57 % (ref 43–75)
NITRITE UR QL STRIP: NEGATIVE
NON-SQ EPI CELLS URNS QL MICRO: NORMAL /HPF
NRBC BLD AUTO-RTO: 0 /100 WBCS
P AXIS: 55 DEGREES
PH UR STRIP.AUTO: 6 [PH] (ref 4.5–8)
PLATELET # BLD AUTO: 246 THOUSANDS/UL (ref 149–390)
PMV BLD AUTO: 11.4 FL (ref 8.9–12.7)
POTASSIUM SERPL-SCNC: 4.4 MMOL/L (ref 3.5–5.3)
PR INTERVAL: 150 MS
PROT UR STRIP-MCNC: NEGATIVE MG/DL
PROTHROMBIN TIME: 13.1 SECONDS (ref 12.1–14.4)
QRS AXIS: -36 DEGREES
QRSD INTERVAL: 72 MS
QT INTERVAL: 372 MS
QTC INTERVAL: 420 MS
RBC # BLD AUTO: 4.46 MILLION/UL (ref 3.81–5.12)
RBC #/AREA URNS AUTO: NORMAL /HPF
RH BLD: NEGATIVE
SODIUM SERPL-SCNC: 140 MMOL/L (ref 136–145)
SP GR UR STRIP.AUTO: 1.01 (ref 1–1.03)
SPECIMEN EXPIRATION DATE: NORMAL
T WAVE AXIS: 52 DEGREES
UROBILINOGEN UR QL STRIP.AUTO: 0.2 E.U./DL
VENTRICULAR RATE: 77 BPM
WBC # BLD AUTO: 6.2 THOUSAND/UL (ref 4.31–10.16)
WBC #/AREA URNS AUTO: NORMAL /HPF

## 2018-04-11 PROCEDURE — 85610 PROTHROMBIN TIME: CPT

## 2018-04-11 PROCEDURE — 86900 BLOOD TYPING SEROLOGIC ABO: CPT

## 2018-04-11 PROCEDURE — 80048 BASIC METABOLIC PNL TOTAL CA: CPT

## 2018-04-11 PROCEDURE — 85730 THROMBOPLASTIN TIME PARTIAL: CPT

## 2018-04-11 PROCEDURE — 93010 ELECTROCARDIOGRAM REPORT: CPT | Performed by: INTERNAL MEDICINE

## 2018-04-11 PROCEDURE — 36415 COLL VENOUS BLD VENIPUNCTURE: CPT

## 2018-04-11 PROCEDURE — 93005 ELECTROCARDIOGRAM TRACING: CPT

## 2018-04-11 PROCEDURE — 86901 BLOOD TYPING SEROLOGIC RH(D): CPT

## 2018-04-11 PROCEDURE — 86850 RBC ANTIBODY SCREEN: CPT

## 2018-04-11 PROCEDURE — 81001 URINALYSIS AUTO W/SCOPE: CPT

## 2018-04-11 PROCEDURE — 83036 HEMOGLOBIN GLYCOSYLATED A1C: CPT

## 2018-04-11 PROCEDURE — 85025 COMPLETE CBC W/AUTO DIFF WBC: CPT

## 2018-04-11 PROCEDURE — 71046 X-RAY EXAM CHEST 2 VIEWS: CPT

## 2018-04-11 RX ORDER — FERROUS SULFATE 325(65) MG
325 TABLET ORAL 2 TIMES DAILY WITH MEALS
COMMUNITY
End: 2018-11-07

## 2018-04-11 RX ORDER — ASCORBIC ACID 500 MG
500 TABLET ORAL DAILY
COMMUNITY
End: 2019-06-06 | Stop reason: CLARIF

## 2018-04-11 RX ORDER — FOLIC ACID 1 MG/1
TABLET ORAL DAILY
COMMUNITY
End: 2018-11-07

## 2018-04-11 NOTE — PRE-PROCEDURE INSTRUCTIONS
Pre-Surgery Instructions:   Medication Instructions    ascorbic acid (VITAMIN C) 500 mg tablet Instructed patient per Anesthesia Guidelines   ferrous sulfate 325 (65 Fe) mg tablet Instructed patient per Anesthesia Guidelines   folic acid (FOLVITE) 1 mg tablet Instructed patient per Anesthesia Guidelines   lisinopril (ZESTRIL) 5 mg tablet Instructed patient per Anesthesia Guidelines   metoprolol succinate (TOPROL-XL) 25 mg 24 hr tablet Instructed patient per Anesthesia Guidelines  Last dose of lisinopril 4/90/18  Last dose folic acid vitamin c and iron 4/29/18  Metoprolol take day of surgery with small sip of water    ACE/ARB Med Class     Do not take this medication the day before and the morning of the day of surgery/procedure  Beta blocker Med Class     Continue to take this heart medication on your normal schedule  If this is an oral medication and you take it in the morning, then you may take this medicine with a sip of water

## 2018-04-13 ENCOUNTER — TRANSCRIBE ORDERS (OUTPATIENT)
Dept: LAB | Facility: HOSPITAL | Age: 83
End: 2018-04-13

## 2018-04-24 ENCOUNTER — TELEPHONE (OUTPATIENT)
Dept: OBGYN CLINIC | Facility: HOSPITAL | Age: 83
End: 2018-04-24

## 2018-04-24 NOTE — TELEPHONE ENCOUNTER
Call placed to do pre-op elective admission assessment, spoke with pt  Pt reports she lives in a ranch style home with her , Rivka Perdomo is planned caregiver for pt when [facility]  Per pt, Rivka Perdomo would be available to help her daily with her ADLS and transport  Pt has 3 steps to get into the home, she would enter through the garage and enter to the first and only level  Pt has no steps inside she would have to maneuver  Pt currently ambulates intermittently with a cane  She has a rolling walker, cane, and raised toilet seat  Pt has a step in tub, she also has a shower chair  Pt uses [facility] [place]  She has her post-op lovenox RX, she plans to fill it PTA  Pt self manages her medications, she uses a pillbox  Pt initially reporting she would like home PT, she did home PT "for about a week" when she had her previous R TKA  I explained the benefits of outpt PT (maintaining independence, increased resources at outpt site, good to get up and moving postoperatively etc  )  I explained that home PT vs  Outpt PT is determined in the hospital by her care team and is based off her progression with PT/OT while inpt  Pt verbalizes understanding and reports she is agreeable to do postoperative outpt PT at [facility]  Pt declined enrollment into caresense  Pt denies having questions/concerns  Education provided on early ambulation (POD0), incentive spirometer (indication for and use of, 10x/hour while awake), & foot/leg pumps (indication for and use of, 18 hours/day)

## 2018-04-27 ENCOUNTER — ANESTHESIA EVENT (OUTPATIENT)
Dept: PERIOP | Facility: HOSPITAL | Age: 83
DRG: 470 | End: 2018-04-27
Payer: COMMERCIAL

## 2018-04-29 NOTE — ANESTHESIA PREPROCEDURE EVALUATION
Review of Systems/Medical History  Patient summary reviewed  Chart reviewed      Cardiovascular  Exercise tolerance: good,  Hypertension , No past MI ,   Comment: Pt had "broken heart" however no stents/cabg  Good functional status  ,  Pulmonary  Negative pulmonary ROS        GI/Hepatic  Negative GI/hepatic ROS          Negative  ROS        Endo/Other  Negative endo/other ROS      GYN       Hematology  Negative hematology ROS      Musculoskeletal    Arthritis     Neurology  Negative neurology ROS      Psychology   Negative psychology ROS              Physical Exam    Airway    Mallampati score: III  TM Distance: >3 FB  Neck ROM: full     Dental   upper dentures,     Cardiovascular  Cardiovascular exam normal    Pulmonary  Pulmonary exam normal     Other Findings      Lab Results   Component Value Date    WBC 6 20 04/11/2018    HGB 14 0 04/11/2018     04/11/2018     Lab Results   Component Value Date     04/11/2018    K 4 4 04/11/2018    BUN 17 04/11/2018    CREATININE 1 01 04/11/2018    GLUCOSE 99 06/01/2017     Lab Results   Component Value Date    PTT 29 04/11/2018      Lab Results   Component Value Date    INR 0 99 04/11/2018       Blood type O    Lab Results   Component Value Date    HGBA1C 5 4 04/11/2018     No current facility-administered medications for this encounter  Current Outpatient Prescriptions:     ascorbic acid (VITAMIN C) 500 mg tablet, Take 500 mg by mouth daily, Disp: , Rfl:     ferrous sulfate 325 (65 Fe) mg tablet, Take 325 mg by mouth 2 (two) times a day with meals, Disp: , Rfl:     folic acid (FOLVITE) 1 mg tablet, Take by mouth daily, Disp: , Rfl:     lisinopril (ZESTRIL) 5 mg tablet, Take 5 mg by mouth daily  , Disp: , Rfl:     metoprolol succinate (TOPROL-XL) 25 mg 24 hr tablet, Take 1 tablet by mouth daily, Disp: , Rfl:     Anesthesia Plan  ASA Score- 3     Anesthesia Type- regional, spinal and IV sedation with anesthesia with ASA Monitors     Additional Monitors:   Airway Plan:     Comment:   I, Dr Steffen Quiroz, the attending physician, has personally seen and evaluated the patient prior to anesthetic care  I have reviewed the pre-anesthetic record, and other medical records if appropriate to the anesthetic care  Risks and benefits discussed with patient; patient consented and agrees to proceed  If a CRNA is involved in the case, I have reviewed the CRNA assessment, if present, and agree  The patient is in a suitable condition to proceed with my formulated anesthetic plan        Plan Factors-  Patient did not smoke on day of surgery  Induction- intravenous  Postoperative Plan-     Informed Consent- Anesthetic plan and risks discussed with patient  I personally reviewed this patient with the CRNA  Discussed and agreed on the Anesthesia Plan with the CRNA  Vanessa Hloley

## 2018-04-30 ENCOUNTER — HOSPITAL ENCOUNTER (INPATIENT)
Facility: HOSPITAL | Age: 83
LOS: 2 days | Discharge: HOME | DRG: 470 | End: 2018-05-02
Attending: ORTHOPAEDIC SURGERY | Admitting: ORTHOPAEDIC SURGERY
Payer: COMMERCIAL

## 2018-04-30 ENCOUNTER — ANESTHESIA (OUTPATIENT)
Dept: PERIOP | Facility: HOSPITAL | Age: 83
DRG: 470 | End: 2018-04-30
Payer: COMMERCIAL

## 2018-04-30 DIAGNOSIS — I10 HYPERTENSION, UNSPECIFIED TYPE: ICD-10-CM

## 2018-04-30 DIAGNOSIS — M17.12 PRIMARY OSTEOARTHRITIS OF LEFT KNEE: Primary | ICD-10-CM

## 2018-04-30 LAB
ABO GROUP BLD: NORMAL
BLD GP AB SCN SERPL QL: NEGATIVE
RH BLD: NEGATIVE
SPECIMEN EXPIRATION DATE: NORMAL

## 2018-04-30 PROCEDURE — 0SRD0J9 REPLACEMENT OF LEFT KNEE JOINT WITH SYNTHETIC SUBSTITUTE, CEMENTED, OPEN APPROACH: ICD-10-PCS | Performed by: ORTHOPAEDIC SURGERY

## 2018-04-30 PROCEDURE — C1776 JOINT DEVICE (IMPLANTABLE): HCPCS | Performed by: ORTHOPAEDIC SURGERY

## 2018-04-30 PROCEDURE — 27447 TOTAL KNEE ARTHROPLASTY: CPT | Performed by: ORTHOPAEDIC SURGERY

## 2018-04-30 PROCEDURE — 97163 PT EVAL HIGH COMPLEX 45 MIN: CPT

## 2018-04-30 PROCEDURE — C1713 ANCHOR/SCREW BN/BN,TIS/BN: HCPCS | Performed by: ORTHOPAEDIC SURGERY

## 2018-04-30 PROCEDURE — G8979 MOBILITY GOAL STATUS: HCPCS

## 2018-04-30 PROCEDURE — 86901 BLOOD TYPING SEROLOGIC RH(D): CPT | Performed by: ORTHOPAEDIC SURGERY

## 2018-04-30 PROCEDURE — 27447 TOTAL KNEE ARTHROPLASTY: CPT | Performed by: PHYSICIAN ASSISTANT

## 2018-04-30 PROCEDURE — 86850 RBC ANTIBODY SCREEN: CPT | Performed by: ORTHOPAEDIC SURGERY

## 2018-04-30 PROCEDURE — 86900 BLOOD TYPING SEROLOGIC ABO: CPT | Performed by: ORTHOPAEDIC SURGERY

## 2018-04-30 PROCEDURE — G8978 MOBILITY CURRENT STATUS: HCPCS

## 2018-04-30 DEVICE — ATTUNE KNEE SYSTEM TIBIAL INSERT FIXED BEARING POSTERIOR STABILIZED 5 7MM AOX
Type: IMPLANTABLE DEVICE | Site: KNEE | Status: FUNCTIONAL
Brand: ATTUNE

## 2018-04-30 DEVICE — SMARTSET HV HIGH VISCOSITY BONE CEMENT 40G
Type: IMPLANTABLE DEVICE | Site: KNEE | Status: FUNCTIONAL
Brand: SMARTSET

## 2018-04-30 DEVICE — ATTUNE KNEE SYSTEM FEMORAL POSTERIOR STABILIZED SIZE 5 LEFT CEMENTED
Type: IMPLANTABLE DEVICE | Site: KNEE | Status: FUNCTIONAL
Brand: ATTUNE

## 2018-04-30 DEVICE — ATTUNE PATELLA MEDIALIZED DOME 35MM CEMENTED AOX
Type: IMPLANTABLE DEVICE | Site: PATELLA | Status: FUNCTIONAL
Brand: ATTUNE

## 2018-04-30 DEVICE — ATTUNE KNEE SYSTEM TIBIAL BASE FIXED BEARING SIZE 4 CEMENTED
Type: IMPLANTABLE DEVICE | Site: KNEE | Status: FUNCTIONAL
Brand: ATTUNE

## 2018-04-30 RX ORDER — FENTANYL CITRATE 50 UG/ML
INJECTION, SOLUTION INTRAMUSCULAR; INTRAVENOUS AS NEEDED
Status: DISCONTINUED | OUTPATIENT
Start: 2018-04-30 | End: 2018-04-30 | Stop reason: SURG

## 2018-04-30 RX ORDER — HYDRALAZINE HYDROCHLORIDE 25 MG/1
25 TABLET, FILM COATED ORAL EVERY 8 HOURS PRN
Status: DISCONTINUED | OUTPATIENT
Start: 2018-04-30 | End: 2018-05-02 | Stop reason: HOSPADM

## 2018-04-30 RX ORDER — LABETALOL HYDROCHLORIDE 5 MG/ML
10 INJECTION, SOLUTION INTRAVENOUS
Status: DISCONTINUED | OUTPATIENT
Start: 2018-04-30 | End: 2018-04-30 | Stop reason: HOSPADM

## 2018-04-30 RX ORDER — FERROUS SULFATE 325(65) MG
325 TABLET ORAL
Status: DISCONTINUED | OUTPATIENT
Start: 2018-05-01 | End: 2018-05-02 | Stop reason: HOSPADM

## 2018-04-30 RX ORDER — BUPIVACAINE HYDROCHLORIDE 5 MG/ML
INJECTION, SOLUTION PERINEURAL AS NEEDED
Status: DISCONTINUED | OUTPATIENT
Start: 2018-04-30 | End: 2018-04-30 | Stop reason: SURG

## 2018-04-30 RX ORDER — CALCIUM CARBONATE 200(500)MG
1000 TABLET,CHEWABLE ORAL DAILY PRN
Status: DISCONTINUED | OUTPATIENT
Start: 2018-04-30 | End: 2018-05-02 | Stop reason: HOSPADM

## 2018-04-30 RX ORDER — LISINOPRIL 5 MG/1
5 TABLET ORAL DAILY
Status: DISCONTINUED | OUTPATIENT
Start: 2018-04-30 | End: 2018-04-30

## 2018-04-30 RX ORDER — ONDANSETRON 2 MG/ML
4 INJECTION INTRAMUSCULAR; INTRAVENOUS ONCE AS NEEDED
Status: DISCONTINUED | OUTPATIENT
Start: 2018-04-30 | End: 2018-04-30 | Stop reason: HOSPADM

## 2018-04-30 RX ORDER — ASCORBIC ACID 500 MG
500 TABLET ORAL DAILY
Status: DISCONTINUED | OUTPATIENT
Start: 2018-04-30 | End: 2018-05-02 | Stop reason: HOSPADM

## 2018-04-30 RX ORDER — SODIUM CHLORIDE, SODIUM LACTATE, POTASSIUM CHLORIDE, CALCIUM CHLORIDE 600; 310; 30; 20 MG/100ML; MG/100ML; MG/100ML; MG/100ML
100 INJECTION, SOLUTION INTRAVENOUS CONTINUOUS
Status: DISCONTINUED | OUTPATIENT
Start: 2018-04-30 | End: 2018-04-30 | Stop reason: HOSPADM

## 2018-04-30 RX ORDER — FOLIC ACID 1 MG/1
1 TABLET ORAL DAILY
Status: DISCONTINUED | OUTPATIENT
Start: 2018-04-30 | End: 2018-05-02 | Stop reason: HOSPADM

## 2018-04-30 RX ORDER — SODIUM CHLORIDE, SODIUM LACTATE, POTASSIUM CHLORIDE, CALCIUM CHLORIDE 600; 310; 30; 20 MG/100ML; MG/100ML; MG/100ML; MG/100ML
20 INJECTION, SOLUTION INTRAVENOUS CONTINUOUS
Status: DISCONTINUED | OUTPATIENT
Start: 2018-04-30 | End: 2018-04-30 | Stop reason: HOSPADM

## 2018-04-30 RX ORDER — OXYCODONE HYDROCHLORIDE 5 MG/1
TABLET ORAL
Qty: 60 TABLET | Refills: 0 | Status: SHIPPED | OUTPATIENT
Start: 2018-04-30 | End: 2018-05-18

## 2018-04-30 RX ORDER — ONDANSETRON 2 MG/ML
INJECTION INTRAMUSCULAR; INTRAVENOUS AS NEEDED
Status: DISCONTINUED | OUTPATIENT
Start: 2018-04-30 | End: 2018-04-30 | Stop reason: SURG

## 2018-04-30 RX ORDER — METOPROLOL SUCCINATE 25 MG/1
25 TABLET, EXTENDED RELEASE ORAL DAILY
Status: DISCONTINUED | OUTPATIENT
Start: 2018-04-30 | End: 2018-05-02 | Stop reason: HOSPADM

## 2018-04-30 RX ORDER — FENTANYL CITRATE/PF 50 MCG/ML
25 SYRINGE (ML) INJECTION
Status: DISCONTINUED | OUTPATIENT
Start: 2018-04-30 | End: 2018-04-30 | Stop reason: HOSPADM

## 2018-04-30 RX ORDER — LIDOCAINE HYDROCHLORIDE 10 MG/ML
INJECTION, SOLUTION INFILTRATION; PERINEURAL AS NEEDED
Status: DISCONTINUED | OUTPATIENT
Start: 2018-04-30 | End: 2018-04-30 | Stop reason: SURG

## 2018-04-30 RX ORDER — CEFAZOLIN SODIUM 1 G/3ML
INJECTION, POWDER, FOR SOLUTION INTRAMUSCULAR; INTRAVENOUS AS NEEDED
Status: DISCONTINUED | OUTPATIENT
Start: 2018-04-30 | End: 2018-04-30 | Stop reason: SURG

## 2018-04-30 RX ORDER — DOCUSATE SODIUM 100 MG/1
100 CAPSULE, LIQUID FILLED ORAL 2 TIMES DAILY
Status: DISCONTINUED | OUTPATIENT
Start: 2018-04-30 | End: 2018-05-02 | Stop reason: HOSPADM

## 2018-04-30 RX ORDER — TRAMADOL HYDROCHLORIDE 50 MG/1
50 TABLET ORAL EVERY 6 HOURS PRN
Qty: 60 TABLET | Refills: 0 | Status: SHIPPED | OUTPATIENT
Start: 2018-04-30 | End: 2018-05-18

## 2018-04-30 RX ORDER — MORPHINE SULFATE 4 MG/ML
4 INJECTION, SOLUTION INTRAMUSCULAR; INTRAVENOUS EVERY 2 HOUR PRN
Status: DISCONTINUED | OUTPATIENT
Start: 2018-04-30 | End: 2018-05-02 | Stop reason: HOSPADM

## 2018-04-30 RX ORDER — ACETAMINOPHEN 325 MG/1
650 TABLET ORAL EVERY 4 HOURS PRN
Status: DISCONTINUED | OUTPATIENT
Start: 2018-04-30 | End: 2018-05-02 | Stop reason: HOSPADM

## 2018-04-30 RX ORDER — MAGNESIUM HYDROXIDE 1200 MG/15ML
LIQUID ORAL AS NEEDED
Status: DISCONTINUED | OUTPATIENT
Start: 2018-04-30 | End: 2018-04-30 | Stop reason: HOSPADM

## 2018-04-30 RX ORDER — OXYCODONE HYDROCHLORIDE 10 MG/1
10 TABLET ORAL EVERY 4 HOURS PRN
Status: DISCONTINUED | OUTPATIENT
Start: 2018-04-30 | End: 2018-05-02 | Stop reason: HOSPADM

## 2018-04-30 RX ORDER — ALBUTEROL SULFATE 2.5 MG/3ML
2.5 SOLUTION RESPIRATORY (INHALATION) ONCE AS NEEDED
Status: DISCONTINUED | OUTPATIENT
Start: 2018-04-30 | End: 2018-04-30 | Stop reason: HOSPADM

## 2018-04-30 RX ORDER — PROPOFOL 10 MG/ML
INJECTION, EMULSION INTRAVENOUS CONTINUOUS PRN
Status: DISCONTINUED | OUTPATIENT
Start: 2018-04-30 | End: 2018-04-30 | Stop reason: SURG

## 2018-04-30 RX ORDER — SODIUM CHLORIDE, SODIUM LACTATE, POTASSIUM CHLORIDE, CALCIUM CHLORIDE 600; 310; 30; 20 MG/100ML; MG/100ML; MG/100ML; MG/100ML
125 INJECTION, SOLUTION INTRAVENOUS CONTINUOUS
Status: DISCONTINUED | OUTPATIENT
Start: 2018-04-30 | End: 2018-04-30 | Stop reason: HOSPADM

## 2018-04-30 RX ORDER — TRAMADOL HYDROCHLORIDE 50 MG/1
50 TABLET ORAL EVERY 6 HOURS SCHEDULED
Status: DISCONTINUED | OUTPATIENT
Start: 2018-04-30 | End: 2018-05-02 | Stop reason: HOSPADM

## 2018-04-30 RX ORDER — SODIUM CHLORIDE, SODIUM LACTATE, POTASSIUM CHLORIDE, CALCIUM CHLORIDE 600; 310; 30; 20 MG/100ML; MG/100ML; MG/100ML; MG/100ML
125 INJECTION, SOLUTION INTRAVENOUS CONTINUOUS
Status: DISCONTINUED | OUTPATIENT
Start: 2018-04-30 | End: 2018-05-02 | Stop reason: HOSPADM

## 2018-04-30 RX ORDER — METOPROLOL SUCCINATE 25 MG/1
25 TABLET, EXTENDED RELEASE ORAL DAILY
Status: DISCONTINUED | OUTPATIENT
Start: 2018-04-30 | End: 2018-04-30

## 2018-04-30 RX ORDER — OXYCODONE HYDROCHLORIDE 5 MG/1
5 TABLET ORAL EVERY 4 HOURS PRN
Status: DISCONTINUED | OUTPATIENT
Start: 2018-04-30 | End: 2018-05-02 | Stop reason: HOSPADM

## 2018-04-30 RX ADMIN — SODIUM CHLORIDE, SODIUM LACTATE, POTASSIUM CHLORIDE, AND CALCIUM CHLORIDE 125 ML/HR: .6; .31; .03; .02 INJECTION, SOLUTION INTRAVENOUS at 09:56

## 2018-04-30 RX ADMIN — SODIUM CHLORIDE, SODIUM LACTATE, POTASSIUM CHLORIDE, AND CALCIUM CHLORIDE: .6; .31; .03; .02 INJECTION, SOLUTION INTRAVENOUS at 07:41

## 2018-04-30 RX ADMIN — DOCUSATE SODIUM 100 MG: 100 CAPSULE, LIQUID FILLED ORAL at 18:01

## 2018-04-30 RX ADMIN — MORPHINE SULFATE 4 MG: 4 INJECTION INTRAVENOUS at 20:57

## 2018-04-30 RX ADMIN — PROPOFOL 80 MCG/KG/MIN: 10 INJECTION, EMULSION INTRAVENOUS at 07:49

## 2018-04-30 RX ADMIN — FENTANYL CITRATE 25 MCG: 50 INJECTION, SOLUTION INTRAMUSCULAR; INTRAVENOUS at 07:47

## 2018-04-30 RX ADMIN — Medication 1000 MG: at 18:09

## 2018-04-30 RX ADMIN — FOLIC ACID 1 MG: 1 TABLET ORAL at 12:44

## 2018-04-30 RX ADMIN — TRAMADOL HYDROCHLORIDE 50 MG: 50 TABLET, FILM COATED ORAL at 12:44

## 2018-04-30 RX ADMIN — BUPIVACAINE HYDROCHLORIDE 12.5 ML: 5 INJECTION, SOLUTION PERINEURAL at 07:47

## 2018-04-30 RX ADMIN — LIDOCAINE HYDROCHLORIDE 50 ML: 10 INJECTION, SOLUTION INFILTRATION; PERINEURAL at 07:50

## 2018-04-30 RX ADMIN — OXYCODONE HYDROCHLORIDE 10 MG: 10 TABLET ORAL at 16:24

## 2018-04-30 RX ADMIN — ONDANSETRON 4 MG: 2 INJECTION INTRAMUSCULAR; INTRAVENOUS at 08:00

## 2018-04-30 RX ADMIN — SODIUM CHLORIDE, SODIUM LACTATE, POTASSIUM CHLORIDE, AND CALCIUM CHLORIDE 125 ML/HR: .6; .31; .03; .02 INJECTION, SOLUTION INTRAVENOUS at 19:43

## 2018-04-30 RX ADMIN — DOCUSATE SODIUM 100 MG: 100 CAPSULE, LIQUID FILLED ORAL at 12:44

## 2018-04-30 RX ADMIN — CEFAZOLIN SODIUM 2000 MG: 10 INJECTION, POWDER, FOR SOLUTION INTRAVENOUS at 16:25

## 2018-04-30 RX ADMIN — DEXAMETHASONE SODIUM PHOSPHATE 10 MG: 10 INJECTION INTRAMUSCULAR; INTRAVENOUS at 08:00

## 2018-04-30 RX ADMIN — OXYCODONE HYDROCHLORIDE AND ACETAMINOPHEN 500 MG: 500 TABLET ORAL at 12:44

## 2018-04-30 RX ADMIN — CEFAZOLIN 2000 MG: 1 INJECTION, POWDER, FOR SOLUTION INTRAVENOUS at 07:45

## 2018-04-30 NOTE — OP NOTE
OPERATIVE REPORT  PATIENT NAME: Joshua Smart    :  1935  MRN: [id number]  Pt Location: BE OR ROOM 04    SURGERY DATE: 2018    Surgeon(s) and Role:     * Elizabeth Sánchez MD - Primary     * Lisa Carvajal PA-C - Assisting    Preop Diagnosis:  Primary osteoarthritis of left knee [M17 12]    Post-Op Diagnosis Codes:     * Primary osteoarthritis of left knee [M17 12]    Procedure(s) (LRB):  ARTHROPLASTY KNEE TOTAL (Left)    Specimen(s):  * No specimens in log *    Estimated Blood Loss:   Minimal    Drains:  Closed/Suction Drain Left;Lateral Knee Accordion 10 Fr  (Active)   Number of days: 0       Urethral Catheter Latex 16 Fr  (Active)   Number of days: 0       Anesthesia Type:   Spinal w/ Femoral Nerve Block    Operative Indications:  Primary osteoarthritis of left knee [M17 12]      Operative Findings:  depuy attune   Femur-5   Poly-7   Tibia-4  [id number]    Complications:   None    Procedure and Technique: Following induction of adequate level of spinal anesthesia, Barron catheters and sterilely introduced into this patient's bladder  Antibiotics were administered  The left thigh was then fitted with a thigh-high tourniquet  The left lower extremity was then prepped and draped sterilely  The left lower extremity was exsanguinated gravity, the tourniquet inflated to 300 mm mercury  A midline knee incision was created the knee in flexion  Full-thickness flaps raised in order to access the extensor mechanism  A medial arthrotomy was created open knee joint  Bony soft tissue releases were performed where necessary  The distal femoral cut was made 6° valgus  This is made over a long intramedullary anne  The proximal tibia cut was then made next  As this was a varus knee, 2 mm reference medially  Care was taken in order to protect the integrity medial collateral, lateral collateral, and posterior structures during these maneuvers  The box cut was made for the posterior stabilized unit    The distal femoral sizing guide was used, the appropriate form 1 cutting block was impacted  The anterior, posterior, chamfer cuts were then made  With the trial components in, the knee was taken through range of motion, and found to be capable of full extension, good flexion, no mid flexion valgus instability  The patella was then resurfaced will utilize the manufacture's equipment, was found to be a size 35 mm button  The trial components removed, and the knee was prepared for insertion of cemented components  The cemented tibia, cemented femur, trial poly, and cemented patella placed  Excess cement was removed, the knee was brought into extension  The cement was allowed to cure  The trial poly was taken out, the knee was packed off  The tourniquet was deflated, hemostasis was secured  The insert polyethylene was then snapped into position  The knee was taken through a final range of motion, found to be capable of full extension, good flexion, no mid flexion valgus instability  Satisfied with the extent of surgery, the wounds then flushed with saline and closed  A drain was placed deep brought out via separate lateral stab incision  A Betadine soak was initiated  The arthrotomy was closed number Vicryl suture  The subcu tissue closed 2 Vicryl suture  The skin was closed staples  The drain was reconstituted, sterile dressings applied    She was then transferred to recovery room in stable condition with plans to include physical therapy weight-bearing to tolerance, she will require DVT prophylaxis with Lovenox   I was present for the entire procedure    Patient Disposition:  PACU     SIGNATURE: Yair Jha MD  DATE: April 30, 2018  TIME: 8:51 AM

## 2018-04-30 NOTE — PHYSICAL THERAPY NOTE
Physical Therapy Evaluation    Patient's Name: Jeannette Lin    Admitting Diagnosis  Primary osteoarthritis of left knee [M17 12]    Problem List  Patient Active Problem List   Diagnosis    Primary osteoarthritis of left knee       Past Medical History  Past Medical History:   Diagnosis Date    Cardiac disease     History of transfusion     Hypertension     MI, old        Past Surgical History  Past Surgical History:   Procedure Laterality Date     SECTION      JOINT REPLACEMENT      right     TONSILLECTOMY      TOTAL KNEE ARTHROPLASTY Right           18 1410   Note Type   Note type Eval only   Pain Assessment   Pain Assessment 0-10   Pain Score 2   Pain Type Acute pain;Surgical pain   Pain Location Knee   Pain Orientation Left   Patient's Stated Pain Goal No pain   Hospital Pain Intervention(s) Ambulation/increased activity   Response to Interventions unchanged   Home Living   Type of Home House   Additional Comments Resides w/  in 1 story home, 2 AYESHA  Normally indep self care  Has RW, commode, etc but doesn';t use  drives normally     Prior Function   Level of [place] Independent with ADLs and functional mobility   Falls in the last 6 months 0   Restrictions/Precautions   Weight Bearing Precautions Per Order Yes   LLE Weight Bearing Per Order WBAT   Cognition   Overall Cognitive Status WFL   Arousal/Participation Alert   Orientation Level Oriented X4   Memory Unable to assess   Following Commands Follows all commands and directions without difficulty   RLE Assessment   RLE Assessment (strength 4/5)   LLE Assessment   LLE Assessment (strength 3/5)   Bed Mobility   Supine to Sit 4  Minimal assistance   Additional items Assist x 1   Transfers   Sit to Stand 4  Minimal assistance   Additional items Assist x 1   Stand to Sit 4  Minimal assistance   Additional items Assist x 1   Ambulation/Elevation   Gait pattern (L knee buckling x 1- due to nerve block, slow, antalgic)   Gait Assistance 4  Minimal assist   Additional items Assist x 1   Assistive Device Rolling walker   Distance 5'x1   Balance   Static Sitting Good   Dynamic Sitting Fair +   Static Standing Fair   Dynamic Standing Fair -   Ambulatory Fair -   Endurance Deficit   Endurance Deficit Yes   Endurance Deficit Description pain, weakness   Activity Tolerance   Activity Tolerance Patient limited by fatigue;Patient limited by pain;Treatment limited secondary to medical complications (Comment)   Nurse Made Aware yes   Assessment   Prognosis Good   Problem List Decreased strength;Decreased range of motion;Decreased endurance; Impaired balance;Decreased mobility;Pain;Orthopedic restrictions   Assessment Pt seen for high complexity physical therapy evaluation  Pt is an [de-identified] y/o female w/ history/comorbidities of CAD, MI, HTN, prior R TKA now admitted w/ known L knee OA for L TKA, done today  Given post op pain, continued effects of nerve block w/ fall risk due to same, note unstable clinical picture  PT consulted to assess post op mobility, d/c needs  PT presents w/ decreased functional mob, standing balance, endurance, L knee ROM/L LE strength, barriers at home  will benefit from skilled PT to correct for the above problems  recommend home w/ OPPt when stable  Goals   Patient Goals none stated   [facility] Expiration Date 05/05/18   Short Term Goal #1 1-5 days: bed mob and transfers w/ indep, standing balance to good/normal w/ device, ambulate 200-300 ft w/ RW and mod I WBAT on L, increase L knee ROM to at least 0-90* and L LE strength by 1/2 -1 grade, indep HEP, ambulate 2 stairs w/ S   Treatment Day 0   Plan   Treatment/Interventions Functional transfer training;LE strengthening/ROM; Therapeutic exercise; Endurance training;Patient/family training;Equipment eval/education; Bed mobility;Gait training   PT Frequency Twice a day;7x/wk   Recommendation   Recommendation (home w / OPPT when stable)   Equipment Recommended Walker   PT - OK to Discharge Yes  (home w/ OPPT when stable)   Modified Ferry Scale   Modified [place]erry Scale 4   Barthel Index   Feeding 10   Bathing 0   Grooming Score 5   Dressing Score 5   Bladder Score 0   Bowels Score 10   Toilet Use Score 5   Transfers (Bed/Chair) Score 5   Mobility (Level Surface) Score 0   Stairs Score 0   Barthel Index Score 40         Arleen Martinez PT, DPT, CSRS

## 2018-04-30 NOTE — ANESTHESIA POSTPROCEDURE EVALUATION
Post-Op Assessment Note      CV Status:  Stable    Mental Status:  Alert and awake    Hydration Status:  Euvolemic    PONV Controlled:  Controlled    Airway Patency:  Patent    Post Op Vitals Reviewed: Yes          Staff: CRNA, Anesthesiologist           BP (P) 92/53 (04/30/18 0857)    Temp (!) (P) 97 °F (36 1 °C) (04/30/18 0857)    Pulse (P) 84 (04/30/18 0857)   Resp (!) (P) 11 (04/30/18 0857)    SpO2   97

## 2018-04-30 NOTE — SOCIAL WORK
CM met with patient and explained cm role  Pt alert and oriented  Pt reports she lives in a ranch style home with  Quintin Shone [phone] w/3 elizabeth  Pt reports DME: rw, and commode, denies VNA, and rehab  Pt reports being independent PTA, reports good support at home w/ planned caregiver at d/c, she drives and has transport with  for d/c  Pts pharmacy is [facility] in [place]  No POA  Pt denies hx/admission for drugs/etoh and psych/mental health  Pt reports her Lovenox script is at her home drugstore, they were ordering the drug and it should be coming in by Tuesday 5/1  CM notified pt drug would need to be filled prior to d/c, and informed pt about meds to bed program  Pt is schedule for outpt PT with YANDEL Del Castillo on 5/3 @ 9am     CM discussed DASH, patient acknowledged understanding of information  CM reviewed d/c planning process including the following: identifying help at home, patient preference for d/c planning needs, Discharge Lounge, [facility] Meds to Bed program, availability of treatment team to discuss questions or concerns patient and/or family may have regarding understanding medications and recognizing signs and symptoms once discharged  CM also encouraged patient to follow up with all recommended appointments after discharge  Patient advised of importance for patient and family to participate in managing patients medical well being

## 2018-04-30 NOTE — PLAN OF CARE
Problem: PHYSICAL THERAPY ADULT  Goal: Performs mobility at highest level of function for planned discharge setting  See evaluation for individualized goals  Treatment/Interventions: Functional transfer training, LE strengthening/ROM, Therapeutic exercise, Endurance training, Patient/family training, Equipment eval/education, Bed mobility, Gait training  Equipment Recommended: Pham Higgins       See flowsheet documentation for full assessment, interventions and recommendations  Prognosis: Good  Problem List: Decreased strength, Decreased range of motion, Decreased endurance, Impaired balance, Decreased mobility, Pain, Orthopedic restrictions  Assessment: Pt seen for high complexity physical therapy evaluation  Pt is an [de-identified] y/o female w/ history/comorbidities of CAD, MI, HTN, prior R TKA now admitted w/ known L knee OA for L TKA, done today  Given post op pain, continued effects of nerve block w/ fall risk due to same, note unstable clinical picture  PT consulted to assess post op mobility, d/c needs  PT presents w/ decreased functional mob, standing balance, endurance, L knee ROM/L LE strength, barriers at home  will benefit from skilled PT to correct for the above problems  recommend home w/ OPPt when stable  Recommendation:  (home w / OPPT when stable)     PT - OK to Discharge: (S) Yes (home w/ OPPT when stable)    See flowsheet documentation for full assessment

## 2018-04-30 NOTE — PROGRESS NOTES
Ortho aware pt's hemovac emptied for 700ml since 0945 this morning  No new orders  Will continue to monitor

## 2018-04-30 NOTE — ANESTHESIA PROCEDURE NOTES
Spinal Block    Patient location during procedure: OR  Start time: 4/30/2018 7:40 AM  End time: 4/30/2018 7:47 AM  Reason for block: primary anesthetic  Staffing  Anesthesiologist: Sarah Srivastava  Performed: anesthesiologist   Preanesthetic Checklist  Completed: patient identified, site marked, surgical consent, pre-op evaluation, timeout performed, IV checked, risks and benefits discussed and monitors and equipment checked  Spinal Block  Patient position: sitting  Prep: ChloraPrep  Location: L3-4  Injection technique: single-shot  Needle  Needle type: pencil-tip   Needle gauge: 25 G  Needle length: 5 cm  Assessment  Sensory level: T10  Injection Assessment:  negative aspiration for heme, no paresthesia on injection and positive aspiration for clear CSF    Post-procedure:  site cleaned

## 2018-04-30 NOTE — H&P (VIEW-ONLY)
Office Visit     2018  [address] [facility] [place] Nino MD   Orthopedic Surgery   Primary osteoarthritis of left knee   Dx   Left Knee - Pain; Referred by Olena Lay DO   Reason for Visit    Progress Notes     Expand All Collapse All    [de-identified] y  o female for follow-up  She has had no relief following the injection of corticosteroid to a left knee that has bicompartmental arthritis  She describes significant pain level left knee joint, the pain is made worse bearing weight, the pain worsens with increased activities  She desires resolution of this pain, as her level of pain precludes activities daily living     Review of Systems  Review of systems negative unless otherwise specified in HPI     Past Medical History  Medical History        Past Medical History:   Diagnosis Date    Cardiac disease      Hypertension      MI, old              Past Surgical History  Surgical History         Past Surgical History:   Procedure Laterality Date     SECTION        TONSILLECTOMY        TOTAL KNEE ARTHROPLASTY Right              Current Medications         Current Outpatient Prescriptions on File Prior to Visit   Medication Sig Dispense Refill    lisinopril (ZESTRIL) 5 mg tablet Take 5 mg by mouth daily          metoprolol succinate (TOPROL-XL) 25 mg 24 hr tablet Take 1 tablet by mouth daily        HYDROcodone-acetaminophen (NORCO) 5-325 mg per tablet Take 1 tablet by mouth every 6 (six) hours as needed for pain Max Daily Amount: 4 tablets 15 tablet 0      No current facility-administered medications on file prior to visit           Recent Labs [facility])  @LABALLVALUEIP(HCT:3,HGB:3,PT,INR,WBC:3,ESR,CRP,GLUCOSE,HGBA1C)@        Physical exam  Gait pattern has antalgia  Breathing is nonlabored  Left hip was well  Left thighs devoid of atrophy left knee is in varus  There is bony enlargement tendons medially    There is crepitation flexion extension  There is no palpable warmth of the synovium  Calf compartments are soft supple  Toes are warm, sensate, mobile      Imaging  Review of previous x-rays shows medial patellofemoral compartment arthritis in the left knee     Procedure  None performed     Assessment/Plan:   [de-identified] y  o female with left knee pain and dysfunction stemming from osteoarthritis  She has been through a comprehensive nonsurgical treatment plan, and as result, I think elective left total knee replacement surgery is indicated  After review of the risks and benefits, consent was that the above-mentioned surgery  No guarantees given  I would welcome the opportunity see back in the office a postoperative patient      Instructions         Return for Postop      After Visit Summary (Printed 1/30/2018)   Additional Documentation     Vitals:    /83    Pulse 73    Ht 5' 4" (1 626 m)    Wt 73 kg (161 lb)    BMI 27 64 kg/m²    BSA 1 78 m²    Flowsheets:    Custom Formula Data       Encounter Info:    Billing Info,    History,    Allergies,    Detailed Report       Media     Forms - Scan on 2/6/2018 10:27 AM : pre-op consult request formForms - Scan on 2/6/2018 10:27 AM : pre-op consult request form    Orders Placed      Case request operating room: ARTHROPLASTY KNEE TOTAL Once    Questionnaire Series Once   Medication Changes        None      Medication List   Visit Diagnoses         Primary osteoarthritis of left knee      Problem List

## 2018-04-30 NOTE — CONSULTS
Office Visit     2018  [address] [facility] Zeb Champion MD   Orthopedic Surgery   Primary osteoarthritis of left knee   Dx   Left Knee - Pain; Referred by Mac Morgan DO   Reason for Visit    Progress Notes     Expand All Collapse All    [de-identified] y  o female for follow-up  She has had no relief following the injection of corticosteroid to a left knee that has bicompartmental arthritis  She describes significant pain level left knee joint, the pain is made worse bearing weight, the pain worsens with increased activities  She desires resolution of this pain, as her level of pain precludes activities daily living     Review of Systems  Review of systems negative unless otherwise specified in HPI     Past Medical History  Medical History        Past Medical History:   Diagnosis Date    Cardiac disease      Hypertension      MI, old              Past Surgical History  Surgical History         Past Surgical History:   Procedure Laterality Date     SECTION        TONSILLECTOMY        TOTAL KNEE ARTHROPLASTY Right              Current Medications         Current Outpatient Prescriptions on File Prior to Visit   Medication Sig Dispense Refill    lisinopril (ZESTRIL) 5 mg tablet Take 5 mg by mouth daily          metoprolol succinate (TOPROL-XL) 25 mg 24 hr tablet Take 1 tablet by mouth daily        HYDROcodone-acetaminophen (NORCO) 5-325 mg per tablet Take 1 tablet by mouth every 6 (six) hours as needed for pain Max Daily Amount: 4 tablets 15 tablet 0      No current facility-administered medications on file prior to visit           Recent Labs [facility])  @LABALLVALUEIP(HCT:3,HGB:3,PT,INR,WBC:3,ESR,CRP,GLUCOSE,HGBA1C)@        Physical exam  Gait pattern has antalgia  Breathing is nonlabored  Left hip was well  Left thighs devoid of atrophy left knee is in varus  There is bony enlargement tendons medially    There is crepitation flexion extension  There is no palpable warmth of the synovium  Calf compartments are soft supple  Toes are warm, sensate, mobile      Imaging  Review of previous x-rays shows medial patellofemoral compartment arthritis in the left knee     Procedure  None performed     Assessment/Plan:   [de-identified] y  o female with left knee pain and dysfunction stemming from osteoarthritis  She has been through a comprehensive nonsurgical treatment plan, and as result, I think elective left total knee replacement surgery is indicated  After review of the risks and benefits, consent was that the above-mentioned surgery  No guarantees given  I would welcome the opportunity see back in the office a postoperative patient      Instructions         Return for Postop      After Visit Summary (Printed 1/30/2018)   Additional Documentation     Vitals:    /83    Pulse 73    Ht 5' 4" (1 626 m)    Wt 73 kg (161 lb)    BMI 27 64 kg/m²    BSA 1 78 m²    Flowsheets:    Custom Formula Data       Encounter Info:    Billing Info,    History,    Allergies,    Detailed Report       Media     Forms - Scan on 2/6/2018 10:27 AM : pre-op consult request formForms - Scan on 2/6/2018 10:27 AM : pre-op consult request form    Orders Placed      Case request operating room: ARTHROPLASTY KNEE TOTAL Once    Questionnaire Series Once   Medication Changes        None      Medication List   Visit Diagnoses         Primary osteoarthritis of left knee      Problem List

## 2018-04-30 NOTE — DISCHARGE INSTRUCTIONS
Discharge Instructions - Dale Yao [de-identified] y o  female MRN: [id number]  Unit/Bed#: PACU 06    Weight Bearing Status:                                           Weight Bearing as tolerated to the left lower extremity  DVT prophylaxis:  Complete course of Lovenox as directed    Pain:  Continue analgesics as directed    Showering Instructions:   Do not shower until POD #3    Dressing Instructions:   Keep dressing clean, dry and intact until follow up appointment  Driving Instructions:  No driving until cleared by Orthopaedic Surgery  PT/OT:  Continue PT/OT on outpatient basis as directed    Appt Instructions:    If you do not have your appointment, please call the clinic at [phone]  Otherwise followup as scheduled below:

## 2018-04-30 NOTE — CONSULTS
Consultation - Prachi Wilson [de-identified] y o  female MRN: [id number]    Unit/Bed#: CW3 346-01 Encounter: [id number]        History of Present Illness     HPI: Prachi Wilson is a [de-identified]y o  year old female, with PMH of HTN and MI, who presented 18 for an elective Lt TKA by Dr Hill Never  She had failed conservative treatment  Pt tolerated the surgery with minimal blood loss  She denies any complaints currently  ROS:  Constitutional: Negative  HENT: Negative  Respiratory: Negative  Cardiovascular: Negative  Gastrointestinal: Negative  Musculoskeletal: Negative  Neurological: Negative  Psychiatric/Behavioral: Negative          Historical Information   Past Medical History:   Diagnosis Date    Cardiac disease     History of transfusion     Hypertension     MI, old      Past Surgical History:   Procedure Laterality Date     SECTION      JOINT REPLACEMENT      right     TONSILLECTOMY      TOTAL KNEE ARTHROPLASTY Right      Social History   History   Alcohol Use No     History   Drug Use No     History   Smoking Status    Never Smoker   Smokeless Tobacco    Never Used     Family History   Problem Relation Age of Onset    Cancer Sister        Meds/Allergies   current meds:  Current Facility-Administered Medications   Medication Dose Route Frequency    acetaminophen (TYLENOL) tablet 650 mg  650 mg Oral Q4H PRN    ascorbic acid (VITAMIN C) tablet 500 mg  500 mg Oral Daily    calcium carbonate (TUMS) chewable tablet 1,000 mg  1,000 mg Oral Daily PRN    ceFAZolin (ANCEF) 2,000 mg in sodium chloride 0 9 % 50 mL IVPB  2,000 mg Intravenous Q8H    docusate sodium (COLACE) capsule 100 mg  100 mg Oral BID    [START ON 2018] enoxaparin (LOVENOX) subcutaneous injection 40 mg  40 mg Subcutaneous Q24H [facility]    [START ON 2018] ferrous sulfate tablet 325 mg  325 mg Oral Daily With Breakfast    folic acid (FOLVITE) tablet 1 mg  1 mg Oral Daily    lactated ringers infusion  125 mL/hr Intravenous Continuous    lisinopril (ZESTRIL) tablet 5 mg  5 mg Oral Daily    metoprolol succinate (TOPROL-XL) 24 hr tablet 25 mg  25 mg Oral Daily    morphine (PF) 4 mg/mL injection 4 mg  4 mg Intravenous Q2H PRN    oxyCODONE (ROXICODONE) immediate release tablet 10 mg  10 mg Oral Q4H PRN    oxyCODONE (ROXICODONE) IR tablet 5 mg  5 mg Oral Q4H PRN    traMADol (ULTRAM) tablet 50 mg  50 mg Oral Q6H [address]       PTA meds:   Prescriptions Prior to Admission   Medication    ascorbic acid (VITAMIN C) 500 mg tablet    ferrous sulfate 325 (65 Fe) mg tablet    folic acid (FOLVITE) 1 mg tablet    lisinopril (ZESTRIL) 5 mg tablet    metoprolol succinate (TOPROL-XL) 25 mg 24 hr tablet     No Known Allergies    Objective   Vitals: Blood pressure 121/63, pulse 72, temperature 97 5 °F (36 4 °C), resp  rate (!) 11, height 5' 4" (1 626 m), weight 71 2 kg (157 lb), SpO2 98 %  Physical Exam   Constitutional: Pt is oriented to person, place, and time  HENT:   Head: Normocephalic  Eyes: EOM are normal  Pupils are equal, round, and reactive to light  Neck: Neck supple  Cardiovascular: Normal rate and regular rhythm  No murmur heard  Pulmonary/Chest: Breath sounds normal  No respiratory distress  Pt has no wheezes  Pt has no rales  Abdominal: Soft  Bowel sounds are normal  Pt exhibits no distension  There is no tenderness  There is no rebound and no guarding  Musculoskeletal: No edema  Lt LE wrapped  Drain in place  Neurological: Pt is alert and oriented to person, place, and time  Psychiatric: Pt has a normal mood and affect  Lab Results:                   Glucose (mg/dL)   Date Value   06/01/2017 99   08/09/2015 108       Labs reviewed    Imaging: reviewed  EKG, Pathology, and Other Studies: I have personally reviewed pertinent reports      VTE Prophylaxis: Enoxaparin (Lovenox)    Code Status: Level 1 - Full Code   Advance Directive and Living Will:      Power of :    POLST: Assessment/Plan     1  Lt knee OA s/p Lt TKA: Continue post op pain control measures as prescribed  Follow bowel regimen to help decrease narcotic induced constipation  Follow post operative hemoglobin with serial CBC and treat accordingly  Monitor WBC and fever curve post op while encouraging use of incentive spirometer  DVT prophylaxis in place and reviewed  2   HTN: Monitor BP every shift  Pt takes lisinopril 5mg daily and Toprol XL 25mg daily as an outpt  Will hold lisinopril in the post-op period to decrease the risk of IVON  Add Hydralazine 25mg every 8 hours as needed for SBP > 160     3  History of MI:  Pt and family report as a result of broken heart syndrome  She does not take ASA daily  Counseling / Coordination of Care  Total floor / unit time spent today 30 minutes  Greater than 50% of total time was spent with the patient and / or family counseling and / or coordination of care        Eileen Carrel, PA-C

## 2018-04-30 NOTE — INTERVAL H&P NOTE
H&P reviewed  After examining the patient I find no changes in the patients condition since the H&P had been written      Preop for lefdt TKA

## 2018-05-01 ENCOUNTER — DOCUMENTATION (OUTPATIENT)
Dept: OBGYN CLINIC | Facility: HOSPITAL | Age: 83
End: 2018-05-01

## 2018-05-01 LAB
ANION GAP SERPL CALCULATED.3IONS-SCNC: 5 MMOL/L (ref 4–13)
BUN SERPL-MCNC: 13 MG/DL (ref 5–25)
CALCIUM SERPL-MCNC: 10.1 MG/DL (ref 8.3–10.1)
CHLORIDE SERPL-SCNC: 108 MMOL/L (ref 100–108)
CO2 SERPL-SCNC: 28 MMOL/L (ref 21–32)
CREAT SERPL-MCNC: 0.97 MG/DL (ref 0.6–1.3)
ERYTHROCYTE [DISTWIDTH] IN BLOOD BY AUTOMATED COUNT: 13.7 % (ref 11.6–15.1)
GFR SERPL CREATININE-BSD FRML MDRD: 55 ML/MIN/1.73SQ M
GLUCOSE SERPL-MCNC: 123 MG/DL (ref 65–140)
HCT VFR BLD AUTO: 31.7 % (ref 34.8–46.1)
HGB BLD-MCNC: 10.8 G/DL (ref 11.5–15.4)
MCH RBC QN AUTO: 31.8 PG (ref 26.8–34.3)
MCHC RBC AUTO-ENTMCNC: 34.1 G/DL (ref 31.4–37.4)
MCV RBC AUTO: 93 FL (ref 82–98)
PLATELET # BLD AUTO: 204 THOUSANDS/UL (ref 149–390)
PMV BLD AUTO: 10.9 FL (ref 8.9–12.7)
POTASSIUM SERPL-SCNC: 4.9 MMOL/L (ref 3.5–5.3)
RBC # BLD AUTO: 3.4 MILLION/UL (ref 3.81–5.12)
SODIUM SERPL-SCNC: 141 MMOL/L (ref 136–145)
WBC # BLD AUTO: 11.52 THOUSAND/UL (ref 4.31–10.16)

## 2018-05-01 PROCEDURE — 97166 OT EVAL MOD COMPLEX 45 MIN: CPT

## 2018-05-01 PROCEDURE — G8987 SELF CARE CURRENT STATUS: HCPCS

## 2018-05-01 PROCEDURE — 99024 POSTOP FOLLOW-UP VISIT: CPT | Performed by: ORTHOPAEDIC SURGERY

## 2018-05-01 PROCEDURE — G8988 SELF CARE GOAL STATUS: HCPCS

## 2018-05-01 PROCEDURE — 97110 THERAPEUTIC EXERCISES: CPT

## 2018-05-01 PROCEDURE — G8989 SELF CARE D/C STATUS: HCPCS

## 2018-05-01 PROCEDURE — 85027 COMPLETE CBC AUTOMATED: CPT | Performed by: PHYSICIAN ASSISTANT

## 2018-05-01 PROCEDURE — 97530 THERAPEUTIC ACTIVITIES: CPT

## 2018-05-01 PROCEDURE — 80048 BASIC METABOLIC PNL TOTAL CA: CPT | Performed by: PHYSICIAN ASSISTANT

## 2018-05-01 PROCEDURE — 97116 GAIT TRAINING THERAPY: CPT

## 2018-05-01 RX ADMIN — DOCUSATE SODIUM 100 MG: 100 CAPSULE, LIQUID FILLED ORAL at 18:05

## 2018-05-01 RX ADMIN — ENOXAPARIN SODIUM 40 MG: 40 INJECTION SUBCUTANEOUS at 08:22

## 2018-05-01 RX ADMIN — TRAMADOL HYDROCHLORIDE 50 MG: 50 TABLET, FILM COATED ORAL at 00:14

## 2018-05-01 RX ADMIN — TRAMADOL HYDROCHLORIDE 50 MG: 50 TABLET, FILM COATED ORAL at 23:16

## 2018-05-01 RX ADMIN — TRAMADOL HYDROCHLORIDE 50 MG: 50 TABLET, FILM COATED ORAL at 18:05

## 2018-05-01 RX ADMIN — OXYCODONE HYDROCHLORIDE AND ACETAMINOPHEN 500 MG: 500 TABLET ORAL at 08:22

## 2018-05-01 RX ADMIN — TRAMADOL HYDROCHLORIDE 50 MG: 50 TABLET, FILM COATED ORAL at 05:19

## 2018-05-01 RX ADMIN — OXYCODONE HYDROCHLORIDE 10 MG: 10 TABLET ORAL at 13:49

## 2018-05-01 RX ADMIN — FOLIC ACID 1 MG: 1 TABLET ORAL at 08:22

## 2018-05-01 RX ADMIN — ACETAMINOPHEN 650 MG: 325 TABLET, FILM COATED ORAL at 18:06

## 2018-05-01 RX ADMIN — OXYCODONE HYDROCHLORIDE 10 MG: 10 TABLET ORAL at 08:23

## 2018-05-01 RX ADMIN — Medication 325 MG: at 08:22

## 2018-05-01 RX ADMIN — OXYCODONE HYDROCHLORIDE 10 MG: 10 TABLET ORAL at 18:06

## 2018-05-01 RX ADMIN — CEFAZOLIN SODIUM 2000 MG: 10 INJECTION, POWDER, FOR SOLUTION INTRAVENOUS at 00:14

## 2018-05-01 RX ADMIN — TRAMADOL HYDROCHLORIDE 50 MG: 50 TABLET, FILM COATED ORAL at 11:22

## 2018-05-01 RX ADMIN — DOCUSATE SODIUM 100 MG: 100 CAPSULE, LIQUID FILLED ORAL at 08:22

## 2018-05-01 NOTE — PROGRESS NOTES
Scar Melendez [de-identified] y o  female MRN: [id number]  Unit/Bed#: CW3 346-01      Subjective:  [de-identified] y  o female post operative day 1 left total knee arthroplasty  Pt doing well  Pain controlled      Labs:    0  Lab Value Date/Time   HCT 31 7 (L) 05/01/2018 0510   HCT 41 5 04/11/2018 0945   HCT 43 2 06/01/2017 1529   HCT 39 7 08/09/2015 2324   HGB 10 8 (L) 05/01/2018 0510   HGB 14 0 04/11/2018 0945   HGB 14 9 06/01/2017 1529   HGB 13 7 08/09/2015 2324   INR 0 99 04/11/2018 0945   INR 0 96 08/09/2015 2324   WBC 11 52 (H) 05/01/2018 0510   WBC 6 20 04/11/2018 0945   WBC 7 58 06/01/2017 1529   WBC 7 67 08/09/2015 2324       Meds:    Current Facility-Administered Medications:     acetaminophen (TYLENOL) tablet 650 mg, 650 mg, Oral, Q4H PRN, Rita Escamillausing PA-C    ascorbic acid (VITAMIN C) tablet 500 mg, 500 mg, Oral, Daily, Rita Reusing, PA-C, 500 mg at 04/30/18 1244    calcium carbonate (TUMS) chewable tablet 1,000 mg, 1,000 mg, Oral, Daily PRN, Rita Reusing, PA-C, 1,000 mg at 04/30/18 1809    docusate sodium (COLACE) capsule 100 mg, 100 mg, Oral, BID, Rita Reusing, PA-C, 100 mg at 04/30/18 1801    enoxaparin (LOVENOX) subcutaneous injection 40 mg, 40 mg, Subcutaneous, Q24H [address], Rita Reusing, PA-C    ferrous sulfate tablet 325 mg, 325 mg, Oral, Daily With Breakfast, Rita Carcamo, PA-C    folic acid (FOLVITE) tablet 1 mg, 1 mg, Oral, Daily, Rita Reusing, PA-C, 1 mg at 04/30/18 1244    hydrALAZINE (APRESOLINE) tablet 25 mg, 25 mg, Oral, Q8H PRN, Idalia Orta PA-C    lactated ringers infusion, 125 mL/hr, Intravenous, Continuous, Rita Carcamo PA-C, Stopped at 05/01/18 0518    metoprolol succinate (TOPROL-XL) 24 hr tablet 25 mg, 25 mg, Oral, Daily, Idalia Orta PA-C    morphine (PF) 4 mg/mL injection 4 mg, 4 mg, Intravenous, Q2H PRN, Rita Carcamo PA-C, 4 mg at 04/30/18 2057    oxyCODONE (ROXICODONE) immediate release tablet 10 mg, 10 mg, Oral, Q4H PRN, Rita Carcamo, NICKO, 10 mg at 04/30/18 1624    oxyCODONE (ROXICODONE) IR tablet 5 mg, 5 mg, Oral, Q4H PRN, Yenny Milian PA-C    traMADol Zhang Brocks) tablet 50 mg, 50 mg, Oral, Q6H [address], Lorsammie Milian PA-C, 50 mg at 05/01/18 3679    Blood Culture:   No results found for: BLOODCX    Wound Culture:   No results found for: WOUNDCULT    Ins and Outs:  I/O last 24 hours: In: 3908 8 [P O :890; I V :2918 8; IV Piggyback:100]  Out: 3250 [Urine:1950; Drains:1100; Blood:200]          Physical:  Vitals:    05/01/18 0300   BP: 120/58   Pulse: 75   Resp: 18   Temp: 97 8 °F (36 6 °C)   SpO2: 93%     left lower extremity:  · Dressings C/D/I  · Toes warm and well perfused  · HVx1    _*_*_*_*_*_*_*_*_*_*_*_*_*_*_*_*_*_*_*_*_*_*_*_*_*_*_*_*_*_*_*_*_*_*_*_*_*_*_*_*_*    Assessment: [de-identified] y  o female post operative day 1 left total knee arthroplasty   Doing well    Plan:  · Weight Bearing as tolerated  · Up and out of bed  · DVT prophylaxis  · Analgesics  · PT/OT  · Patient noted to have acute blood loss anemia due to a drop in Hbg of > 2 0g from preop levels, will monitor vital signs and resuscitate with IV fluids as needed    Graciela Hearn MD

## 2018-05-01 NOTE — PLAN OF CARE
Problem: PHYSICAL THERAPY ADULT  Goal: Performs mobility at highest level of function for planned discharge setting  See evaluation for individualized goals  Treatment/Interventions: Functional transfer training, LE strengthening/ROM, Therapeutic exercise, Endurance training, Patient/family training, Equipment eval/education, Bed mobility, Gait training  Equipment Recommended: Meg Barnes       See flowsheet documentation for full assessment, interventions and recommendations  Prognosis: Good  Problem List: Decreased strength, Decreased range of motion, Decreased endurance, Impaired balance, Decreased mobility, Decreased coordination, Impaired judgement, Decreased safety awareness, Decreased skin integrity, Orthopedic restrictions, Pain  Assessment: Pt receievd in room, seated in bedside chair, agreeable to therapy session  Initiated therapeutic exercises this session including quad sets, glut sets, ankle pumps  Pt performed sit to stand transfer with supervision level of assistance and verbal cues for technique  Pt amb 60 ft x 2 WBAT LLE with supervision level of assistance and verbal cues for sequencing and safety  Pt displays antalgic gait pattern, decreased L stance time and variable gait pattern between step to and step through at times  Pt ascended and descended 5 stairs with R handrail and supervision level of assistance for sequencing and safety  Pt will cont to be seen BID to address functional deficits listed  Recommend pt discharge home with family support to outpatient physical therapy when medically stable  Recommendation: Home with family support, Outpatient PT     PT - OK to Discharge: (S) Yes (home w/ OPPT when stable)    See flowsheet documentation for full assessment

## 2018-05-01 NOTE — PROGRESS NOTES
Saw pt at bedside  Pt was in recliner chair with BL foot pumps on  Pt's breathing is quiet and regular at 12-20bpm  Pt is AAOx4  BLE cap refill < 3 seconds  + BL posterior tibial pulses present  pt c/o 3/10 SS pain  Pt denies CP/SOB/dizziness  [facility]  Incentive spirometer provided to pt, education provided  Pt provided return demo  Pt denies having questions/concerns at this time

## 2018-05-01 NOTE — OCCUPATIONAL THERAPY NOTE
[address]  Evaluation     Patient Name: Fabiana Dorsey  NGSEANZARDEN Date: 2018  Problem List  Patient Active Problem List   Diagnosis    Primary osteoarthritis of left knee     Past Medical History  Past Medical History:   Diagnosis Date    Cardiac disease     History of transfusion     Hypertension     MI, old      Past Surgical History  Past Surgical History:   Procedure Laterality Date     SECTION      JOINT REPLACEMENT      right     RI TOTAL KNEE ARTHROPLASTY Left 2018    Procedure: ARTHROPLASTY KNEE TOTAL;  Surgeon: Daniel Auguste MD;  [facility];  Service: Orthopedics    TONSILLECTOMY      TOTAL KNEE ARTHROPLASTY Right          18 0920   Note Type   Note type Eval only   Restrictions/Precautions   Weight Bearing Precautions Per Order Yes   RUE Weight Bearing Per Order WBAT   LUE Weight Bearing Per Order WBAT   RLE Weight Bearing Per Order WBAT   LLE Weight Bearing Per Order FWB   Other Precautions Fall Risk;Pain   Pain Assessment   Pain Assessment 0-10   Pain Score 2   Pain Type Acute pain   Pain Location Knee   Pain Orientation Left   Hospital Pain Intervention(s) Repositioned; Ambulation/increased activity; Emotional support   Home Living   Type of [address] One level   Prior Function   Level of [place] Independent with ADLs and functional mobility   Lives With Spouse   Receives Help From Family   ADL Assistance Independent   IADLs Independent   Falls in the last 6 months 0   Vocational Retired   [address] [place] [address]   Reciprocal Relationships SUPPORTIVE FAMILY   Service to Others RETIRED   Intrinsic Gratification ACTIVE PTA   ADL   Eating Assistance 7  Independent   Grooming Assistance 5  Supervision/Setup   UB Bathing Assistance 5  Supervision/Setup   LB Bathing Assistance 4  Minimal Assistance   [address]  [address] 4  Minimal Assistance   Toileting Assistance  5  Supervision/Setup   Bed Mobility   Supine to Sit 5  Supervision   Transfers   Sit to Stand 5  Supervision   Stand to Sit 5  Supervision   Stand pivot 5  Supervision   Functional Mobility   Functional Mobility 5  Supervision   Additional items Rolling walker   Balance   Static Sitting Good   Dynamic Sitting Fair +   Static Standing Fair   Dynamic Standing Fair   Ambulatory Fair   Activity Tolerance   Activity Tolerance Patient tolerated treatment well   RUE Assessment   RUE Assessment WFL   LUE Assessment   LUE Assessment WFL   Cognition   Overall Cognitive Status WFL   Assessment   Limitation Decreased ADL status; Decreased self-care trans;Decreased high-level ADLs   Prognosis Good   Assessment Pt is a [de-identified] y o  female who was admitted to [facility] on 4/30/2018 with Primary osteoarthritis of left knee s/p L TKR  Pt's problem list also includes PMH of HTN, previous surgery and cardiac disease, h/o transfusion, MI  At baseline pt was completing adls and mobility independently  Pt lives with spouse in 1 story home with 2ste  Currently pt requires min assist for overall ADLS and sba for functional mobility/transfers  Pt currently presents with impairments in the following categories -steps to enter environment, difficulty performing ADLS, difficulty performing IADLS  and health management  activity tolerance, endurance and standing balance/tolerance  These impairments, as well as pt's fatigue, pain, orthopedic restricitions , WBS  and risk for falls  limit pt's ability to safely engage in all baseline areas of occupation, includingbathing, dressing, functional mobility/transfers, community mobility, house maintenance, social participation  and leisure activities however pt reports spouse is supportive and able to provide assist prn - has all necessary dme from prior sx (tkr) From OT standpoint, recommend home with family support  upon D/C   No further acute OT needs indicated at this time- recommend continued mobilization on unit with nursing, PT and restorative, participation in self care, oob to chair and use of BR vs bedpan or commode - OK for d/c when medically cleared- d/c from caseload   Goals   Patient Goals go home    Plan   Treatment Interventions ADL retraining;Functional transfer training;Patient/family training;Equipment evaluation/education; Compensatory technique education; Activityengagement   OT Frequency Eval only   Kaz  mmendation   OT Discharge Recommendation Home with family support   OT - OK to Discharge Yes   Barthel Index   Feeding 10   Bathing 0   Grooming Score 5   Dressing Score 5   Bladder Score 10   Bowels Score 10   Toilet Use Score 5   Transfers (Bed/Chair) Score 10   Mobility (Level Surface) Score 10   Stairs Score 0   Barthel Index Score 65     [place], [place]

## 2018-05-01 NOTE — PHYSICAL THERAPY NOTE
PT Progress Note     05/01/18 8460   Pain Assessment   Pain Assessment 0-10   Pain Score 4   Pain Type Acute pain;Surgical pain   Pain Location Knee   Pain Orientation Left   Hospital Pain Intervention(s) Ambulation/increased activity   Response to Interventions unchanged post mob   Restrictions/Precautions   Weight Bearing Precautions Per Order Yes   LLE Weight Bearing Per Order WBAT   General   Chart Reviewed Yes   Response to Previous Treatment (increased pain post session)   Family/Caregiver Present No   Cognition   Overall Cognitive Status WFL   Orientation Level Oriented X4   Following Commands Follows multistep commands with increased time or repetition   Subjective   Subjective pt in bed, reports unwilling to get OOB 2* increased pain post last session "I only just got comfortable"   Bed Mobility   Supine to Sit 5  Supervision   Additional items Increased time required;Verbal cues   Sit to Supine 5  Supervision   Additional items Increased time required;Verbal cues   Transfers   Sit to Stand 5  Supervision   Additional items Increased time required;Verbal cues   Stand to Sit 5  Supervision   Additional items Increased time required;Verbal cues   Stand pivot 5  Supervision   Additional items Increased time required;Verbal cues  (RW)   Ambulation/Elevation   Gait pattern Improper Weight shift; Antalgic;Decreased foot clearance;Decreased L stance; Short stride; Step to;Excessively slow   Gait Assistance 5  Supervision   Additional items Verbal cues   Assistive Device Rolling walker   Distance 10 ft   Balance   Static Standing Fair +  (RW)   Dynamic Standing Fair -  (RW)   Endurance Deficit   Endurance Deficit Yes   Endurance Deficit Description fatigue   Activity Tolerance   Activity Tolerance Patient tolerated treatment well   Nurse Made Aware yes   Exercises   Ankle Pumps Sitting;20 reps   Assessment   Prognosis Good   Problem List Decreased strength;Decreased range of motion;Decreased endurance; Impaired balance;Decreased mobility; Decreased coordination; Impaired judgement;Decreased safety awareness;Decreased skin integrity;Orthopedic restrictions;Pain   Assessment pt in bed on arrival declining PT 2* pain experienced post last session; pt had multiple concerns re: pain med intake, side effects, over and under medicating, also had questions re: healing time frame anticipated progression of mobility; PT spent extensive time educating pt on all noted concerns, incl the importance of mobility, risks of secondary complications  pt expressed understanding, agreed to mobilize OOB to chair; pt requires supervision w bed mob, however needed education on supporting L limb during mob to EOB and back into bed; performance improved w practice and no pain exacer noted; pt also encouraged to have caregiver support limb to get in and OOB; pt requires supervision w transf and amb using RW; dist limited to room to minimize pain exacer; pt also nauseous c/o reflux;; symptoms improved w burping ginger ale; pt agreeable to remain seated in chair through dinner, encouraged to amb w msg prior to return to bed; appropriate for d/c home w fam support f/u outpt PT when med cleared   Goals   Patient Goals go home tomorrow   STG Expiration Date 05/05/18   Plan   Treatment/Interventions Functional transfer training;LE strengthening/ROM; Therapeutic exercise; Endurance training;Elevations;Cognitive reorientation;Patient/family training;Equipment eval/education; Bed mobility; Compensatory technique education;Gait training;Continued evaluation;Spoke to nursing;Spoke to case management   Progress Slow progress, medical status limitations   PT Frequency Twice a day   Recommendation   Recommendation Home with family support; Outpatient PT   Equipment Recommended Walker   PT - OK to Discharge Yes

## 2018-05-01 NOTE — PROGRESS NOTES
Internal Medicine Progress Note  Patient: Hu Lomeli  Age/sex: [de-identified] y o  female  Medical Record #: [id number]      ASSESSMENT/PLAN:  Hu Lomeli is seen and examined and mangement for following issues:    1  Lt knee OA s/p Lt TKA: Continue post op pain control measures as prescribed  Follow bowel regimen to help decrease narcotic induced constipation  Follow post operative hemoglobin with serial CBC and treat accordingly - Hgb 10 9  DVT prophylaxis in place and reviewed      2  HTN: Monitor BP every shift  Pt takes lisinopril 5mg daily and Toprol XL 25mg daily as an outpt  Will hold lisinopril in the post-op period to decrease the risk of IVON  Continue Toprol XL with hold parameters  Add Hydralazine 25mg every 8 hours as needed for SBP > 160      3  History of MI:  Pt and family report as a result of broken heart syndrome  She does not take ASA daily  4  Leukocytosis: Mild  No signs of infx  Monitor WBC and fever curve post op while encouraging use of incentive spirometer  Subjective: Patient seen and examined  Pt reports she is fatigued from the pain medications  She does report that she slept well  She denies nausea and ate the majority of breakfast  Barron was removed and she was able to void  She denies any other complaints      ROS:   GI: denies abdominal pain, change bowel habits or reflux symptoms  Neuro: No new neurologic changes  Respiratory: No Cough, SOB  Cardiovascular: No CP, palpitations     Scheduled Meds:    Current Facility-Administered Medications:  acetaminophen 650 mg Oral Q4H PRN Max Bookbinder, PA-C    ascorbic acid 500 mg Oral Daily Max Bookbinder, PA-C    calcium carbonate 1,000 mg Oral Daily PRN Max Bookbinder, PA-C    docusate sodium 100 mg Oral BID Max Bookbinder, PA-C    enoxaparin 40 mg Subcutaneous Q24H [facility] Max Bookbinder, PA-C    ferrous sulfate 325 mg Oral Daily With Breakfast Mxa Bookbinder, PA-C    folic acid 1 mg Oral Daily Max Bookbinder, PA-C    hydrALAZINE 25 mg Oral Q8H PRN Huang Guerrero PA-C    lactated ringers 125 mL/hr Intravenous Continuous Tereasa NICKO Bustos Last Rate: Stopped (05/01/18 0518)   metoprolol succinate 25 mg Oral Daily Idalia Orta PA-C    morphine injection 4 mg Intravenous Q2H PRN Tereasa AkashNICKO    oxyCODONE 10 mg Oral Q4H PRN Tereasa BrokawNICKO    oxyCODONE 5 mg Oral Q4H PRN Tereasa Akash, NICKO    traMADol 50 mg Oral Q6H [address] Tereasa AkashNICKO        Labs:       Results from last 7 days  Lab Units 05/01/18  0510   WBC Thousand/uL 11 52*   HEMOGLOBIN g/dL 10 8*   HEMATOCRIT % 31 7*   PLATELETS Thousands/uL 204       Results from last 7 days  Lab Units 05/01/18  0510   SODIUM mmol/L 141   POTASSIUM mmol/L 4 9   CHLORIDE mmol/L 108   CO2 mmol/L 28   BUN mg/dL 13   CREATININE mg/dL 0 97   GLUCOSE RANDOM mg/dL 123   CALCIUM mg/dL 10 1                Glucose (mg/dL)   Date Value   05/01/2018 123   06/01/2017 99   08/09/2015 108       Labs reviewed    Physical Examination:  Vitals:   Vitals:    04/30/18 1901 05/01/18 0025 05/01/18 0300 05/01/18 0716   BP: 125/70 117/58 120/58 120/63   BP Location: Left arm Right arm Right arm Right arm   Pulse: 90 77 75 83   Resp: 16 18 18 18   Temp: 98 6 °F (37 °C) 97 6 °F (36 4 °C) 97 8 °F (36 6 °C) 97 6 °F (36 4 °C)   TempSrc: Oral Axillary Oral Oral   SpO2: 97% 95% 93% 95%   Weight:       Height:           GEN: NAD  HEENT: NC/AT, EOMI  RESP: CTAB, no R/R/W  CV: +S1 S2, regular rate, no rubs  ABD: soft, NT, ND, normal BS   : No Barron  EXT: Trace edema bilat LE  Skin: No rashes  Lt LE wrapped  Drain in place  Neuro: AAOx3  [ X ] Total time spent: 30 Mins and greater than 50% of this time was spent counseling/coordinating care        Huang Guerrero PA-C  Internal Medicine

## 2018-05-01 NOTE — PHYSICAL THERAPY NOTE
PHYSICAL THERAPY NOTE          Patient Name: Van SÁNCHEZ Date: 5/1/2018 05/01/18 1200   Pain Assessment   Pain Assessment 0-10   Pain Score 1   Pain Type Acute pain;Surgical pain   Pain Location Knee   Pain Orientation Left   Hospital Pain Intervention(s) Repositioned; Ambulation/increased activity   Response to Interventions pain increased to 3/10 at termination of session   Restrictions/Precautions   Weight Bearing Precautions Per Order Yes   LLE Weight Bearing Per Order WBAT   Other Precautions Chair Alarm;WBS;Fall Risk;Pain   General   Chart Reviewed Yes   Family/Caregiver Present No   Cognition   Overall Cognitive Status WFL   Arousal/Participation Alert   Orientation Level Oriented X4   Following Commands Follows multistep commands with increased time or repetition   Subjective   Subjective Pt states that she does not have too much pain right now, but it might come   Transfers   Sit to Stand 5  Supervision   Additional items Increased time required;Verbal cues   Stand to Sit 5  Supervision   Additional items Increased time required;Verbal cues   Stand pivot 5  Supervision   Additional items Increased time required;Verbal cues   Ambulation/Elevation   Gait pattern Improper Weight shift; Antalgic;Decreased foot clearance;Decreased L stance; Short stride   Gait Assistance 5  Supervision   Additional items Verbal cues; Tactile cues   Assistive Device Rolling walker   Distance 60 ft x 2   Stair Management Assistance 5  Supervision   Additional items Verbal cues; Increased time required   Stair Management Technique One rail R;Step to pattern; Foreward; Sideways   Number of Stairs 5   Balance   Static Sitting Good   Dynamic Sitting Fair +  (forward reach)   Static Standing Fair  (RW)   Dynamic Standing Fair -  (RW)   Ambulatory Fair -  (RW)   Endurance Deficit   Endurance Deficit Yes   Endurance Deficit Description pain, fatigue Activity Tolerance   Activity Tolerance Patient tolerated treatment well   Nurse Made Aware yes   Exercises   Quad Sets 10 reps; Sitting;Left   Glute Sets 10 reps; Sitting   Ankle Pumps 25 reps; Sitting;Bilateral   Assessment   Prognosis Good   Problem List Decreased strength;Decreased range of motion;Decreased endurance; Impaired balance;Decreased mobility; Decreased coordination; Impaired judgement;Decreased safety awareness;Decreased skin integrity;Orthopedic restrictions;Pain   Assessment Pt receievd in room, seated in bedside chair, agreeable to therapy session  Initiated therapeutic exercises this session including quad sets, glut sets, ankle pumps  Pt performed sit to stand transfer with supervision level of assistance and verbal cues for technique  Pt amb 60 ft x 2 WBAT LLE with supervision level of assistance and verbal cues for sequencing and safety  Pt displays antalgic gait pattern, decreased L stance time and variable gait pattern between step to and step through at times  Pt ascended and descended 5 stairs with R handrail and supervision level of assistance for sequencing and safety  Pt will cont to be seen BID to address functional deficits listed  Recommend pt discharge home with family support to outpatient physical therapy when medically stable  Goals   Patient Goals none expressed   STG Expiration Date 05/05/18   Treatment Day 1   Plan   Treatment/Interventions Functional transfer training;LE strengthening/ROM; Elevations; Therapeutic exercise; Endurance training;Cognitive reorientation;Patient/family training;Equipment eval/education; Bed mobility;Gait training; Compensatory technique education;Spoke to nursing;Spoke to case management   Progress Progressing toward goals   PT Frequency Twice a day;7x/wk   Recommendation   Recommendation Home with family support; Outpatient PT   Equipment Recommended Radhames Mu

## 2018-05-01 NOTE — PLAN OF CARE
Problem: PHYSICAL THERAPY ADULT  Goal: Performs mobility at highest level of function for planned discharge setting  See evaluation for individualized goals  Treatment/Interventions: Functional transfer training, LE strengthening/ROM, Therapeutic exercise, Endurance training, Patient/family training, Equipment eval/education, Bed mobility, Gait training  Equipment Recommended: Jade Redmauri       See flowsheet documentation for full assessment, interventions and recommendations  Prognosis: Good  Problem List: Decreased strength, Decreased range of motion, Decreased endurance, Impaired balance, Decreased mobility, Decreased coordination, Impaired judgement, Decreased safety awareness, Decreased skin integrity, Orthopedic restrictions, Pain  Assessment: pt in bed on arrival declining PT 2* pain experienced post last session; pt had multiple concerns re: pain med intake, side effects, over and under medicating, also had questions re: healing time frame anticipated progression of mobility; PT spent extensive time educating pt on all noted concerns, incl the importance of mobility, risks of secondary complications  pt expressed understanding, agreed to mobilize OOB to chair; pt requires supervision w bed mob, however needed education on supporting L limb during mob to EOB and back into bed; performance improved w practice and no pain exacer noted; pt also encouraged to have caregiver support limb to get in and OOB; pt requires supervision w transf and amb using RW; dist limited to room to minimize pain exacer; pt also nauseous c/o reflux;; symptoms improved w burping ginger ale; pt agreeable to remain seated in chair through dinner, encouraged to amb w msg prior to return to bed; appropriate for d/c home w fam support f/u outpt PT when med cleared        Recommendation: Home with family support, Outpatient PT     PT - OK to Discharge: Yes    See flowsheet documentation for full assessment

## 2018-05-01 NOTE — CASE MANAGEMENT
Initial Clinical Review    Age/Sex: [de-identified] y o  female admitted on 4/30 for elective surgery - OR    Surgery Date: 4/30    Procedure: S/P ARTHROPLASTY KNEE TOTAL (Left Knee)    Anesthesia: Regional, Spinal, IV sedation with anesthesia    Admission Orders: Date/Time/Statement: Inpatient 4/30/18 @ 1035 Med Surg     Orders Placed This Encounter   Procedures    Inpatient Admission     Standing Status:   Standing     Number of Occurrences:   1     Order Specific Question:   Admitting Physician     Answer:   Vincent Cover [[id number]]     Order Specific Question:   Level of Care     Answer:   Med Surg [16]     Order Specific Question:   Estimated length of stay     Answer:   More than 2 Midnights     Order Specific Question:   Certification     Answer:   I certify that inpatient services are medically necessary for this patient for a duration of greater than two midnights  See H&P and MD Progress Notes for additional information about the patient's course of treatment  Vital Signs: /63 (BP Location: Right arm)   Pulse 83   Temp 97 6 °F (36 4 °C) (Oral)   Resp 18   Ht 5' 4" (1 626 m)   Wt 71 2 kg (157 lb)   SpO2 95%   BMI 26 95 kg/m²     Diet:        Diet Orders            Start     Ordered    04/30/18 1035  Diet Regular; Regular House  Diet effective now     Question Answer Comment   Diet Type Regular    Regular Regular House    RD to adjust diet per protocol?  Yes        04/30/18 1034          Mobility: Ambulate with walker  PT/OT eval and treat    DVT Prophylaxis: Foot pump    Scheduled Meds:  Current Facility-Administered Medications:  Cefazolin  Intravenous x2   ascorbic acid 500 mg Oral Daily   docusate sodium 100 mg Oral BID   enoxaparin 40 mg Subcutaneous Q24H TISHA   ferrous sulfate 325 mg Oral Daily With Breakfast   folic acid 1 mg Oral Daily   metoprolol succinate 25 mg Oral Daily   traMADol 50 mg Oral Q6H [address]     Continuous Infusions:  lactated ringers 125 mL/hr Last Rate: Stopped (05/01/18 0518) PRN Meds:   acetaminophen    calcium carbonate    hydrALAZINE    morphine injection Iv x1    oxyCODONE po x2

## 2018-05-02 VITALS
SYSTOLIC BLOOD PRESSURE: 134 MMHG | OXYGEN SATURATION: 95 % | WEIGHT: 157 LBS | TEMPERATURE: 97.7 F | RESPIRATION RATE: 18 BRPM | HEART RATE: 105 BPM | DIASTOLIC BLOOD PRESSURE: 60 MMHG | HEIGHT: 64 IN | BODY MASS INDEX: 26.8 KG/M2

## 2018-05-02 PROBLEM — Z96.652 S/P TOTAL KNEE ARTHROPLASTY, LEFT: Status: ACTIVE | Noted: 2018-05-02

## 2018-05-02 PROBLEM — M17.12 PRIMARY OSTEOARTHRITIS OF LEFT KNEE: Status: RESOLVED | Noted: 2018-01-30 | Resolved: 2018-05-02

## 2018-05-02 LAB
ANION GAP SERPL CALCULATED.3IONS-SCNC: 6 MMOL/L (ref 4–13)
BUN SERPL-MCNC: 15 MG/DL (ref 5–25)
CALCIUM SERPL-MCNC: 9.3 MG/DL (ref 8.3–10.1)
CHLORIDE SERPL-SCNC: 106 MMOL/L (ref 100–108)
CO2 SERPL-SCNC: 27 MMOL/L (ref 21–32)
CREAT SERPL-MCNC: 0.96 MG/DL (ref 0.6–1.3)
GFR SERPL CREATININE-BSD FRML MDRD: 56 ML/MIN/1.73SQ M
GLUCOSE SERPL-MCNC: 96 MG/DL (ref 65–140)
POTASSIUM SERPL-SCNC: 4.3 MMOL/L (ref 3.5–5.3)
SODIUM SERPL-SCNC: 139 MMOL/L (ref 136–145)

## 2018-05-02 PROCEDURE — 80048 BASIC METABOLIC PNL TOTAL CA: CPT | Performed by: PHYSICIAN ASSISTANT

## 2018-05-02 PROCEDURE — 97116 GAIT TRAINING THERAPY: CPT

## 2018-05-02 PROCEDURE — 99024 POSTOP FOLLOW-UP VISIT: CPT | Performed by: ORTHOPAEDIC SURGERY

## 2018-05-02 PROCEDURE — 97110 THERAPEUTIC EXERCISES: CPT

## 2018-05-02 PROCEDURE — 97530 THERAPEUTIC ACTIVITIES: CPT

## 2018-05-02 RX ORDER — DOCUSATE SODIUM 100 MG/1
100 CAPSULE, LIQUID FILLED ORAL 2 TIMES DAILY
Qty: 20 CAPSULE | Refills: 0 | Status: SHIPPED | OUTPATIENT
Start: 2018-05-02 | End: 2018-05-18

## 2018-05-02 RX ORDER — ACETAMINOPHEN 325 MG/1
TABLET ORAL
Qty: 30 TABLET | Refills: 0 | Status: SHIPPED | OUTPATIENT
Start: 2018-05-02 | End: 2018-05-18

## 2018-05-02 RX ADMIN — TRAMADOL HYDROCHLORIDE 50 MG: 50 TABLET, FILM COATED ORAL at 05:27

## 2018-05-02 RX ADMIN — DOCUSATE SODIUM 100 MG: 100 CAPSULE, LIQUID FILLED ORAL at 08:35

## 2018-05-02 RX ADMIN — OXYCODONE HYDROCHLORIDE AND ACETAMINOPHEN 500 MG: 500 TABLET ORAL at 08:34

## 2018-05-02 RX ADMIN — FOLIC ACID 1 MG: 1 TABLET ORAL at 08:34

## 2018-05-02 RX ADMIN — METOPROLOL SUCCINATE 25 MG: 25 TABLET, EXTENDED RELEASE ORAL at 08:34

## 2018-05-02 RX ADMIN — ENOXAPARIN SODIUM 40 MG: 40 INJECTION SUBCUTANEOUS at 08:35

## 2018-05-02 RX ADMIN — Medication 325 MG: at 08:35

## 2018-05-02 RX ADMIN — OXYCODONE HYDROCHLORIDE 10 MG: 10 TABLET ORAL at 09:23

## 2018-05-02 NOTE — DISCHARGE SUMMARY
ORTHOPEDICS DISCHARGE SUMMARY  Jenny Ramires [de-identified] y o  female MRN: [id number]  Unit/Bed#: CW3 346-01    Attending Physician: Fernie Patel    Admitting diagnosis: Primary osteoarthritis of left knee [M17 12]    Discharge diagnosis: Primary osteoarthritis of left knee [M17 12]    Date of admission: 4/30/2018    Date of discharge: 05/02/18         Procedure: Left total knee arthroplasty    HPI:  This is a [de-identified]y o  year old female that presented to the office with signs and symptoms of left knee osteoarthritis  They tried and failed conservative treatment measures and wished to proceed with surgical intervention  The risks, benefits, and complications of the procedure were discussed with the patient and informed consent was obtained  Hospital Course: The patient was admitted to the hospital on 4/30/2018 and underwent an uncomplicated left total knee arthroplasty  They were transferred to the floor after a brief stay in the post-anesthesia care unit  Their pain was well managed with IV and oral pain medications  They began therapy on post operative day #1  Lovenox was also started for DVT prophylaxis post operative day #1  Hemovac drain was removed on POD1  Post operative course was uneventful  On discharge date pt was cleared by PT and the medicine team and determined to be safe for discharge  Daily discussion was had with the patient, nursing staff, orthopaedic team, and family members if present  All questions were answered to the patients satisifaction  0  Lab Value Date/Time   HGB 10 8 (L) 05/01/2018 0510   HGB 14 0 04/11/2018 0945   HGB 14 9 06/01/2017 1529   HGB 13 7 08/09/2015 2324       Greater than 2 gram drop which qualifies for diagnosis of acute blood loss anemia  Vital signs remained stable and pt was resuscitated with IVF as needed     Discharge Instructions: The patient was discharged weight bearing as tolerated to the left lower extremity  Lovenox will be continued for 28 days  Continue PT/OT  Take pain medications as instructed  Discharge Medications: For the complete list of discharge medications, please refer to the patient's medication reconciliation

## 2018-05-02 NOTE — PLAN OF CARE
Problem: Potential for Falls  Goal: Patient will remain free of falls  INTERVENTIONS:  - Assess patient frequently for physical needs  -  Identify cognitive and physical deficits and behaviors that affect risk of falls    -  [place] fall precautions as indicated by assessment   - Educate patient/family on patient safety including physical limitations  - Instruct patient to call for assistance with activity based on assessment  - Modify environment to reduce risk of injury  - Consider OT/PT consult to assist with strengthening/mobility   Outcome: Adequate for Discharge      Problem: Prexisting or High Potential for Compromised Skin Integrity  Goal: Skin integrity is maintained or improved  INTERVENTIONS:  - Identify patients at risk for skin breakdown  - Assess and monitor skin integrity  - Assess and monitor nutrition and hydration status  - Monitor labs (i e  albumin)  - Assess for incontinence   - Turn and reposition patient  - Assist with mobility/ambulation  - Relieve pressure over bony prominences  - Avoid friction and shearing  - Provide appropriate hygiene as needed including keeping skin clean and dry  - Evaluate need for skin moisturizer/barrier cream  - Collaborate with interdisciplinary team (i e  Nutrition, Rehabilitation, etc )   - Patient/family teaching   Outcome: Adequate for Discharge      Problem: DISCHARGE PLANNING - CARE MANAGEMENT  Goal: Discharge to post-acute care or home with appropriate resources  INTERVENTIONS:  - Conduct assessment to determine patient/family and health care team treatment goals, and need for post-acute services based on payer coverage, community resources, and patient preferences, and barriers to discharge  - Address psychosocial, clinical, and financial barriers to discharge as identified in assessment in conjunction with the patient/family and health care team  - Arrange appropriate level of post-acute services according to patient's   needs and preference and payer coverage in collaboration with the physician and health care team  - Communicate with and update the patient/family, physician, and health care team regarding progress on the discharge plan  - Arrange appropriate transportation to post-acute venues  - Discharge to home when medically cleared   Outcome: Adequate for Discharge      Problem: PAIN - ADULT  Goal: Verbalizes/displays adequate comfort level or baseline comfort level  Interventions:  - Encourage patient to monitor pain and request assistance  - Assess pain using appropriate pain scale  - Administer analgesics based on type and severity of pain and evaluate response  - Implement non-pharmacological measures as appropriate and evaluate response  - Consider cultural and social influences on pain and pain management  - Notify physician/advanced practitioner if interventions unsuccessful or patient reports new pain   Outcome: Adequate for Discharge      Problem: INFECTION - ADULT  Goal: Absence or prevention of progression during hospitalization  INTERVENTIONS:  - Assess and monitor for signs and symptoms of infection  - Monitor lab/diagnostic results  - Monitor all insertion sites, i e  indwelling lines, tubes, and drains  - Monitor endotracheal (as able) and nasal secretions for changes in amount and color  - [place] appropriate cooling/warming therapies per order  - Administer medications as ordered  - Instruct and encourage patient and family to use good hand hygiene technique  - Identify and instruct in appropriate isolation precautions for identified infection/condition   Outcome: Adequate for Discharge    Goal: Absence of fever/infection during neutropenic period  INTERVENTIONS:  - Monitor WBC  - Implement neutropenic guidelines   Outcome: Adequate for Discharge      Problem: SAFETY ADULT  Goal: Maintain or return to baseline ADL function  INTERVENTIONS:  -  Assess patient's ability to carry out ADLs; assess patient's baseline for ADL function and identify physical deficits which impact ability to perform ADLs (bathing, care of mouth/teeth, toileting, grooming, dressing, etc )  - Assess/evaluate cause of self-care deficits   - Assess range of motion  - Assess patient's mobility; develop plan if impaired  - Assess patient's need for assistive devices and provide as appropriate  - Encourage maximum independence but intervene and supervise when necessary  ¯ Involve family in performance of ADLs  ¯ Assess for home care needs following discharge   ¯ Request OT consult to assist with ADL evaluation and planning for discharge  ¯ Provide patient education as appropriate   Outcome: Adequate for Discharge    Goal: Maintain or return mobility status to optimal level  INTERVENTIONS:  - Assess patient's baseline mobility status (ambulation, transfers, stairs, etc )    - Identify cognitive and physical deficits and behaviors that affect mobility  - Identify mobility aids required to assist with transfers and/or ambulation (gait belt, sit-to-stand, lift, walker, cane, etc )  - [place] fall precautions as indicated by assessment  - Record patient progress and toleration of activity level on Mobility SBAR; progress patient to next Phase/Stage  - Instruct patient to call for assistance with activity based on assessment  - Request Rehabilitation consult to assist with strengthening/weightbearing, etc    Outcome: Adequate for Discharge

## 2018-05-02 NOTE — PROGRESS NOTES
Internal Medicine Progress Note  Patient: Fabiana Dorsey  Age/sex: [de-identified] y o  female  Medical Record #: [id number]      ASSESSMENT/PLAN:  Fabiana Dorsey is seen and examined and mangement for following issues:    1  Lt knee OA s/p Lt TKA: Continue post op pain control measures as prescribed  Follow bowel regimen to help decrease narcotic induced constipation  Follow post operative hemoglobin with serial CBC and treat accordingly - Hgb 10 9  DVT prophylaxis in place and reviewed      2  HTN: Monitor BP every shift  Pt takes lisinopril 5mg daily and Toprol XL 25mg daily as an outpt  Will hold lisinopril in the post-op period to decrease the risk of IVON  Continue Toprol XL with hold parameters  Resume Lisinopril on D/C     3  History of MI:  Pt and family report as a result of broken heart syndrome  She does not take ASA daily  4  Leukocytosis: Mild  No signs of infx  Remained afebrile    Medically Stable for D/C      Subjective: Patient seen and examined  Pt reports pain controlled  No overnight issues    ROS:   GI: denies abdominal pain, change bowel habits or reflux symptoms  Neuro: No new neurologic changes  Respiratory: No Cough, SOB  Cardiovascular: No CP, palpitations     Scheduled Meds:    Current Facility-Administered Medications:  acetaminophen 650 mg Oral Q4H PRN Joleen Hobson PA-C    ascorbic acid 500 mg Oral Daily Joleen Hobson PA-C    calcium carbonate 1,000 mg Oral Daily PRN Joleen Hobson PA-C    docusate sodium 100 mg Oral BID Joleen Hobson PA-C    enoxaparin 40 mg Subcutaneous Q24H [address] Joleen Hobson PA-C    ferrous sulfate 325 mg Oral Daily With Breakfast Joleen Hobson PA-C    folic acid 1 mg Oral Daily Joleen Hobson PA-C    hydrALAZINE 25 mg Oral Q8H PRN Idalia Orta PA-C    lactated ringers 125 mL/hr Intravenous Continuous Joleen Hobson PA-C Last Rate: Stopped (05/01/18 0518)   metoprolol succinate 25 mg Oral Daily Idalia Orta PA-C    morphine injection 4 mg Intravenous Q2H PRN Jak Man Karrie Hitchcock PA-C    oxyCODONE 10 mg Oral Q4H PRN Hernan Barr PA-C    oxyCODONE 5 mg Oral Q4H PRN Hernan Barr PA-C    traMADol 50 mg Oral Q6H [facility] Hernan Barr PA-C        Labs:       Results from last 7 days  Lab Units 05/01/18  0510   WBC Thousand/uL 11 52*   HEMOGLOBIN g/dL 10 8*   HEMATOCRIT % 31 7*   PLATELETS Thousands/uL 204       Results from last 7 days  Lab Units 05/02/18  0514 05/01/18  0510   SODIUM mmol/L 139 141   POTASSIUM mmol/L 4 3 4 9   CHLORIDE mmol/L 106 108   CO2 mmol/L 27 28   BUN mg/dL 15 13   CREATININE mg/dL 0 96 0 97   GLUCOSE RANDOM mg/dL 96 123   CALCIUM mg/dL 9 3 10 1                  Glucose (mg/dL)   Date Value   05/02/2018 96   05/01/2018 123   06/01/2017 99   08/09/2015 108       Labs reviewed    Physical Examination:  Vitals:   Vitals:    05/01/18 1900 05/01/18 2316 05/01/18 2326 05/02/18 0811   BP: 135/58  147/63 134/60   BP Location: Right arm  Right arm Right arm   Pulse: 96  93 105   Resp: 18 18 18   Temp: 98 °F (36 7 °C)  97 8 °F (36 6 °C) 97 7 °F (36 5 °C)   TempSrc: Oral  Oral Oral   SpO2: 99% 96% 95% 95%   Weight:       Height:           GEN: NAD  HEENT: NC/AT, EOMI  RESP: CTAB, no R/R/W  CV: +S1 S2, regular rate, no rubs  ABD: soft, NT, ND, normal BS   : No Barron  EXT: Trace edema bilat LE  Skin: No rashes  Lt LE wrapped  Drain in place  Neuro: AAOx3  [ X ] Total time spent: 30 Mins and greater than 50% of this time was spent counseling/coordinating care        Ihor Siemens, PA-C  Internal Medicine

## 2018-05-02 NOTE — PLAN OF CARE
Problem: PHYSICAL THERAPY ADULT  Goal: Performs mobility at highest level of function for planned discharge setting  See evaluation for individualized goals  Treatment/Interventions: Functional transfer training, LE strengthening/ROM, Therapeutic exercise, Endurance training, Patient/family training, Equipment eval/education, Bed mobility, Gait training  Equipment Recommended: Conan Boxer       See flowsheet documentation for full assessment, interventions and recommendations  Outcome: Adequate for Discharge  Prognosis: Good  Problem List: Decreased strength, Decreased range of motion, Decreased endurance, Impaired balance, Decreased mobility, Decreased coordination, Impaired judgement, Decreased safety awareness, Decreased skin integrity, Orthopedic restrictions, Pain  Assessment: The pt  has progressed to ambulating community distances without assistance  She was able to perform dynamic head and trunk movements as well as manuever around multiple obstacles readily  She as previously completed the stairs, and she has demonstrated the necessary mobility in order to safely return home  The pt  was able to demonstrate her home exercise program as well  Barriers to Discharge: None     Recommendation: Home with family support, Outpatient PT     PT - OK to Discharge: Yes    See flowsheet documentation for full assessment

## 2018-05-02 NOTE — PLAN OF CARE
Problem: Potential for Falls  Goal: Patient will remain free of falls  INTERVENTIONS:  - Assess patient frequently for physical needs  -  Identify cognitive and physical deficits and behaviors that affect risk of falls    -  [place] fall precautions as indicated by assessment   - Educate patient/family on patient safety including physical limitations  - Instruct patient to call for assistance with activity based on assessment  - Modify environment to reduce risk of injury  - Consider OT/PT consult to assist with strengthening/mobility   Outcome: Progressing      Problem: Prexisting or High Potential for Compromised Skin Integrity  Goal: Skin integrity is maintained or improved  INTERVENTIONS:  - Identify patients at risk for skin breakdown  - Assess and monitor skin integrity  - Assess and monitor nutrition and hydration status  - Monitor labs (i e  albumin)  - Assess for incontinence   - Turn and reposition patient  - Assist with mobility/ambulation  - Relieve pressure over bony prominences  - Avoid friction and shearing  - Provide appropriate hygiene as needed including keeping skin clean and dry  - Evaluate need for skin moisturizer/barrier cream  - Collaborate with interdisciplinary team (i e  Nutrition, Rehabilitation, etc )   - Patient/family teaching   Outcome: Progressing      Problem: DISCHARGE PLANNING - CARE MANAGEMENT  Goal: Discharge to post-acute care or home with appropriate resources  INTERVENTIONS:  - Conduct assessment to determine patient/family and health care team treatment goals, and need for post-acute services based on payer coverage, community resources, and patient preferences, and barriers to discharge  - Address psychosocial, clinical, and financial barriers to discharge as identified in assessment in conjunction with the patient/family and health care team  - Arrange appropriate level of post-acute services according to patient's   needs and preference and payer coverage in collaboration with the physician and health care team  - Communicate with and update the patient/family, physician, and health care team regarding progress on the discharge plan  - Arrange appropriate transportation to post-acute venues  - Discharge to home when medically cleared   Outcome: Progressing      Problem: PAIN - ADULT  Goal: Verbalizes/displays adequate comfort level or baseline comfort level  Interventions:  - Encourage patient to monitor pain and request assistance  - Assess pain using appropriate pain scale  - Administer analgesics based on type and severity of pain and evaluate response  - Implement non-pharmacological measures as appropriate and evaluate response  - Consider cultural and social influences on pain and pain management  - Notify physician/advanced practitioner if interventions unsuccessful or patient reports new pain  Outcome: Progressing      Problem: INFECTION - ADULT  Goal: Absence or prevention of progression during hospitalization  INTERVENTIONS:  - Assess and monitor for signs and symptoms of infection  - Monitor lab/diagnostic results  - Monitor all insertion sites, i e  indwelling lines, tubes, and drains  - Monitor endotracheal (as able) and nasal secretions for changes in amount and color  - [place] appropriate cooling/warming therapies per order  - Administer medications as ordered  - Instruct and encourage patient and family to use good hand hygiene technique  - Identify and instruct in appropriate isolation precautions for identified infection/condition  Outcome: Progressing    Goal: Absence of fever/infection during neutropenic period  INTERVENTIONS:  - Monitor WBC  - Implement neutropenic guidelines  Outcome: Progressing      Problem: SAFETY ADULT  Goal: Maintain or return to baseline ADL function  INTERVENTIONS:  -  Assess patient's ability to carry out ADLs; assess patient's baseline for ADL function and identify physical deficits which impact ability to perform ADLs (bathing, care of mouth/teeth, toileting, grooming, dressing, etc )  - Assess/evaluate cause of self-care deficits   - Assess range of motion  - Assess patient's mobility; develop plan if impaired  - Assess patient's need for assistive devices and provide as appropriate  - Encourage maximum independence but intervene and supervise when necessary  ¯ Involve family in performance of ADLs  ¯ Assess for home care needs following discharge   ¯ Request OT consult to assist with ADL evaluation and planning for discharge  ¯ Provide patient education as appropriate  Outcome: Progressing    Goal: Maintain or return mobility status to optimal level  INTERVENTIONS:  - Assess patient's baseline mobility status (ambulation, transfers, stairs, etc )    - Identify cognitive and physical deficits and behaviors that affect mobility  - Identify mobility aids required to assist with transfers and/or ambulation (gait belt, sit-to-stand, lift, walker, cane, etc )  - [place] fall precautions as indicated by assessment  - Record patient progress and toleration of activity level on Mobility SBAR; progress patient to next Phase/Stage  - Instruct patient to call for assistance with activity based on assessment  - Request Rehabilitation consult to assist with strengthening/weightbearing, etc   Outcome: Progressing

## 2018-05-02 NOTE — PHYSICAL THERAPY NOTE
Physical Therapy Progress Note     05/02/18 1105   Pain Assessment   Pain Assessment 0-10   Pain Score 3   Pain Type Surgical pain   Pain Location Knee   Pain Orientation Left   Hospital Pain Intervention(s) Cold applied;Repositioned; Ambulation/increased activity; Emotional support   Response to Interventions Satisfied  Restrictions/Precautions   Other Precautions Pain   Subjective   Subjective The pt  reports that she is feeling better today, and she is agreeable to work with therapy  She notes that she is ready to return home  Bed Mobility   Supine to Sit 5  Supervision   Additional items LE management; Increased time required;Verbal cues   Transfers   Sit to Stand 5  Supervision   Stand to Sit 5  Supervision   Ambulation/Elevation   Gait pattern Excessively slow;Decreased foot clearance; Antalgic   Gait Assistance 5  Supervision   Additional items Verbal cues   Assistive Device Rolling walker   Distance 160 feet  Balance   Static Sitting Normal   Dynamic Sitting Good   Static Standing Fair +   Ambulatory Fair   Activity Tolerance   Activity Tolerance Patient tolerated treatment well   Nurse Made Awa Mccain RN  Exercises   TKR Supine;Bilateral;AROM;15 reps;AAROM  (x2 sets )   Assessment   Prognosis Good   Problem List Decreased strength;Decreased range of motion;Decreased endurance; Impaired balance;Decreased mobility; Decreased coordination; Impaired judgement;Decreased safety awareness;Decreased skin integrity;Orthopedic restrictions;Pain   Assessment The pt  has progressed to ambulating community distances without assistance  She was able to perform dynamic head and trunk movements as well as manuever around multiple obstacles readily  She as previously completed the stairs, and she has demonstrated the necessary mobility in order to safely return home  The pt  was able to demonstrate her home exercise program as well  Barriers to Discharge None   Goals   Patient Goals To go home today     STG Expiration Date 05/05/18   Treatment Day 3   Plan   Treatment/Interventions Functional transfer training;LE strengthening/ROM; Elevations; Therapeutic exercise; Endurance training;Patient/family training;Bed mobility;Gait training   Progress Improving as expected   PT Frequency Twice a day   Recommendation   Recommendation Home with family support; Outpatient PT   Equipment Recommended Linard Bernheim   PT - OK to Discharge Yes     Dung Marcial, PTA

## 2018-05-02 NOTE — PROGRESS NOTES
Rubio Ott [de-identified] y o  female MRN: [id number]  Unit/Bed#: CW3 346-01      Subjective:  [de-identified] y  o female post operative day 2 left total knee arthroplasty  Did well with physical therapy      Labs:    0  Lab Value Date/Time   HCT 31 7 (L) 05/01/2018 0510   HCT 41 5 04/11/2018 0945   HCT 43 2 06/01/2017 1529   HCT 39 7 08/09/2015 2324   HGB 10 8 (L) 05/01/2018 0510   HGB 14 0 04/11/2018 0945   HGB 14 9 06/01/2017 1529   HGB 13 7 08/09/2015 2324   INR 0 99 04/11/2018 0945   INR 0 96 08/09/2015 2324   WBC 11 52 (H) 05/01/2018 0510   WBC 6 20 04/11/2018 0945   WBC 7 58 06/01/2017 1529   WBC 7 67 08/09/2015 2324       Meds:    Current Facility-Administered Medications:     acetaminophen (TYLENOL) tablet 650 mg, 650 mg, Oral, Q4H PRN, Floy Whitakers, PA-C, 650 mg at 05/01/18 1806    ascorbic acid (VITAMIN C) tablet 500 mg, 500 mg, Oral, Daily, Floy Whitakers, PA-C, 500 mg at 05/01/18 6716    calcium carbonate (TUMS) chewable tablet 1,000 mg, 1,000 mg, Oral, Daily PRN, Floy Whitakers, PA-C, 1,000 mg at 04/30/18 1809    docusate sodium (COLACE) capsule 100 mg, 100 mg, Oral, BID, Floy Whitakers, PA-C, 100 mg at 05/01/18 1805    enoxaparin (LOVENOX) subcutaneous injection 40 mg, 40 mg, Subcutaneous, Q24H [address], Floy Whitakers, PA-C, 40 mg at 05/01/18 8483    ferrous sulfate tablet 325 mg, 325 mg, Oral, Daily With Breakfast, Floy Whitakers, PA-C, 325 mg at 18/75/38 8955    folic acid (FOLVITE) tablet 1 mg, 1 mg, Oral, Daily, Floy Whitakers, PA-C, 1 mg at 05/01/18 1787    hydrALAZINE (APRESOLINE) tablet 25 mg, 25 mg, Oral, Q8H PRN, Idalia Orta PA-C    lactated ringers infusion, 125 mL/hr, Intravenous, Continuous, Kelby Sahu PA-C, Stopped at 05/01/18 0518    metoprolol succinate (TOPROL-XL) 24 hr tablet 25 mg, 25 mg, Oral, Daily, Idalia Orta PA-C    morphine (PF) 4 mg/mL injection 4 mg, 4 mg, Intravenous, Q2H PRN, Kelby Sahu PA-C, 4 mg at 04/30/18 2057    oxyCODONE (ROXICODONE) immediate release tablet 10 mg, 10 mg, Oral, Q4H PRN, Edel Louise PA-C, 10 mg at 05/01/18 1806    oxyCODONE (ROXICODONE) IR tablet 5 mg, 5 mg, Oral, Q4H PRN, Edel Louise PA-C    traMADol Sacha Silvius) tablet 50 mg, 50 mg, Oral, Q6H [address], Edel Louise PA-C, 50 mg at 05/02/18 0461    Blood Culture:   No results found for: BLOODCX    Wound Culture:   No results found for: WOUNDCULT    Ins and Outs:  I/O last 24 hours: In: 2298 8 [P O :1030; I V :1218 8; IV Piggyback:50]  Out: 7675 [Urine:2300; Drains:225]          Physical:  Vitals:    05/01/18 2326   BP: 147/63   Pulse: 93   Resp: 18   Temp: 97 8 °F (36 6 °C)   SpO2: 95%     left lower extremity:  · Incision C/D/I  · Toes warm and well perfused  · SILT s/sp/dp/s  · Motor intact EHL/FHL, plantarflexion/dorsiflexion    _*_*_*_*_*_*_*_*_*_*_*_*_*_*_*_*_*_*_*_*_*_*_*_*_*_*_*_*_*_*_*_*_*_*_*_*_*_*_*_*_*    Assessment: [de-identified] y  o female post operative day 2 left total knee arthroplasty   Doing well    Plan:  · Weight Bearing as tolerated  · Up and out of bed  · DVT prophylaxis  · Analgesics  · PT/OT as able  · Patient noted to have acute blood loss anemia due to a drop in Hbg of > 2 0g from preop levels, will monitor vital signs and resuscitate with IV fluids as needed    David Escobedo

## 2018-05-03 ENCOUNTER — EVALUATION (OUTPATIENT)
Dept: PHYSICAL THERAPY | Facility: CLINIC | Age: 83
End: 2018-05-03
Payer: COMMERCIAL

## 2018-05-03 ENCOUNTER — TELEPHONE (OUTPATIENT)
Dept: OBGYN CLINIC | Facility: HOSPITAL | Age: 83
End: 2018-05-03

## 2018-05-03 DIAGNOSIS — Z96.652 AFTERCARE FOLLOWING LEFT KNEE JOINT REPLACEMENT SURGERY: ICD-10-CM

## 2018-05-03 DIAGNOSIS — Z96.652 S/P TOTAL KNEE ARTHROPLASTY, LEFT: Primary | ICD-10-CM

## 2018-05-03 DIAGNOSIS — Z47.1 AFTERCARE FOLLOWING LEFT KNEE JOINT REPLACEMENT SURGERY: ICD-10-CM

## 2018-05-03 DIAGNOSIS — M17.12 PRIMARY OSTEOARTHRITIS OF LEFT KNEE: ICD-10-CM

## 2018-05-03 PROCEDURE — 97110 THERAPEUTIC EXERCISES: CPT | Performed by: PHYSICAL THERAPIST

## 2018-05-03 PROCEDURE — G8979 MOBILITY GOAL STATUS: HCPCS | Performed by: PHYSICAL THERAPIST

## 2018-05-03 PROCEDURE — G8978 MOBILITY CURRENT STATUS: HCPCS | Performed by: PHYSICAL THERAPIST

## 2018-05-03 PROCEDURE — 97535 SELF CARE MNGMENT TRAINING: CPT | Performed by: PHYSICAL THERAPIST

## 2018-05-03 PROCEDURE — 97162 PT EVAL MOD COMPLEX 30 MIN: CPT | Performed by: PHYSICAL THERAPIST

## 2018-05-03 NOTE — TELEPHONE ENCOUNTER
I spoke with pt today multiple times  Initially I called to do post-op follow up  Pt reporting "I am having a lot of pain", was rating it 1/10 at that time  Pt informed me she did not go to her outpt PT appt today  She reports "I didn't go, it hurts too much"  I offered to go over medication regimen and explained the importance of going to PT within 24-48 hours of DC  Pt reported "I am not going, I will call you when I want to go" and then pt hung up the phone  Kari Hernandez, surgery scheduler and myself called pt again to speak with her about PT  After much education of importance and what pt can expect at first appt, pt agreeable to go to outpt PT  Pt originally wanted to go to [facility], however they do not have any open appointments today or tomorrow  We scheduled pt, with the assistance of Tevin Nieves, at [facility] for 1400 today  I called pt again and spoke with her  Pt was advised that we set her up for a 1400 appointment with [facility] PT  Pt was given the address and appt information, per pt she wrote it down and would attend today's appointment  Per pt her  would transport her to PT today  Pt and I discussed her pain medication regimen  Per pt,  "my  sometimes has slight memory troubles and has been managing my medications for me, I am going to have to start managing them on my own"  Per pt, she was hesitant to take the tramadol so she has not been taking it at all and has been taking one oxycodone tablet every 6 hours  I advised pt that she could take 1-2 tablets of oxycodone every 4 hours as needed for pain  Pt reports her last dose of oxycodone was 0900 this AM   I advised pt to take one at 1300 today, which is 1 hour prior to her PT appt so that she is not limited by pain during PT appt- pt agreeable  I also advised pt that she could add in the tramadol for breakthrough pain    I advised her that the tramadol RX is for one tablet every 6 hours as needed  Pt understands pain medication regimen and reports "I feel much better now that I understand how to take my medications"  Pt reporting that her knee is "not terribly swollen", she reports she is icing multiple times/day  She confirms she is using her walker to ambulate, she denies falls  She confirms she did her lovenox today, she denies bleeding from any source  LBM 4/29- Pt reports that she is usually "not regular" with BMs, she reports her regular is "every other day"  Pt confirms she is passing gas, she denies abdominal pain/nausea  Pt denies docusate use but reports "my  just picked it up from the pharmacy, I plan to start taking it"  I advised pt to take docusate as prescribed (one capsule BID) while she takes oxycodone/tramadol  Pt denies CP/palpitations/SOB/dizziness/fevers/calf pain  Pt denies having any questions/concerns at this time

## 2018-05-07 ENCOUNTER — OFFICE VISIT (OUTPATIENT)
Dept: PHYSICAL THERAPY | Facility: CLINIC | Age: 83
End: 2018-05-07
Payer: COMMERCIAL

## 2018-05-07 DIAGNOSIS — Z96.652 S/P TOTAL KNEE ARTHROPLASTY, LEFT: ICD-10-CM

## 2018-05-07 DIAGNOSIS — M17.12 PRIMARY OSTEOARTHRITIS OF LEFT KNEE: Primary | ICD-10-CM

## 2018-05-07 DIAGNOSIS — Z96.652 AFTERCARE FOLLOWING LEFT KNEE JOINT REPLACEMENT SURGERY: ICD-10-CM

## 2018-05-07 DIAGNOSIS — Z47.1 AFTERCARE FOLLOWING LEFT KNEE JOINT REPLACEMENT SURGERY: ICD-10-CM

## 2018-05-07 PROCEDURE — 97110 THERAPEUTIC EXERCISES: CPT | Performed by: PHYSICAL THERAPIST

## 2018-05-07 PROCEDURE — 97140 MANUAL THERAPY 1/> REGIONS: CPT | Performed by: PHYSICAL THERAPIST

## 2018-05-07 PROCEDURE — 97112 NEUROMUSCULAR REEDUCATION: CPT | Performed by: PHYSICAL THERAPIST

## 2018-05-07 NOTE — PROGRESS NOTES
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 475                   Subjective: The patient reports no change in pain since IE  She does note increased mobility and quad activation since the IE  The patient sees her MD on Wednesday  Objective: See treatment diary below      Assessment: Patient demonstrated increased tolerance to ROM exercises today  Significantly increased ROM noted during both manuals and TE today  Plan: Continue per plan of care  Progress treatment as tolerated          Precautions: HTN    Daily Treatment Diary     Manual  5/3 5/7           L Knee PROM  15'                                                                   Exercise Diary  5/3 5/7           Quad Set 5"x10 10"x10           Gastroc St  20"x2 20"x3           Heel slides  10"x10           Seated HS St    20"x3           Seated Knee Flex St    10"x5           Weight shift  5"x10                                                                                                                                                                                                     Modalities

## 2018-05-08 RX ORDER — FOLIC ACID 1 MG/1
TABLET ORAL
Qty: 30 TABLET | OUTPATIENT
Start: 2018-05-08

## 2018-05-08 RX ORDER — FERROUS SULFATE 325(65) MG
TABLET ORAL
Qty: 60 TABLET | OUTPATIENT
Start: 2018-05-08

## 2018-05-09 ENCOUNTER — OFFICE VISIT (OUTPATIENT)
Dept: OBGYN CLINIC | Facility: HOSPITAL | Age: 83
End: 2018-05-09

## 2018-05-09 ENCOUNTER — HOSPITAL ENCOUNTER (OUTPATIENT)
Dept: RADIOLOGY | Facility: HOSPITAL | Age: 83
Discharge: HOME | End: 2018-05-09
Attending: ORTHOPAEDIC SURGERY
Payer: COMMERCIAL

## 2018-05-09 VITALS
SYSTOLIC BLOOD PRESSURE: 112 MMHG | BODY MASS INDEX: 26.8 KG/M2 | HEART RATE: 116 BPM | WEIGHT: 156.97 LBS | DIASTOLIC BLOOD PRESSURE: 70 MMHG | HEIGHT: 64 IN

## 2018-05-09 DIAGNOSIS — Z96.652 AFTERCARE FOLLOWING LEFT KNEE JOINT REPLACEMENT SURGERY: Primary | ICD-10-CM

## 2018-05-09 DIAGNOSIS — Z47.1 AFTERCARE FOLLOWING LEFT KNEE JOINT REPLACEMENT SURGERY: Primary | ICD-10-CM

## 2018-05-09 DIAGNOSIS — Z47.1 AFTERCARE FOLLOWING LEFT KNEE JOINT REPLACEMENT SURGERY: ICD-10-CM

## 2018-05-09 DIAGNOSIS — Z96.652 AFTERCARE FOLLOWING LEFT KNEE JOINT REPLACEMENT SURGERY: ICD-10-CM

## 2018-05-09 PROCEDURE — 73560 X-RAY EXAM OF KNEE 1 OR 2: CPT

## 2018-05-09 PROCEDURE — 99024 POSTOP FOLLOW-UP VISIT: CPT | Performed by: ORTHOPAEDIC SURGERY

## 2018-05-09 NOTE — PROGRESS NOTES
[de-identified] y o female Who presents back for her initial postoperative visit  Patient is now 9 days status post left total knee  Patient reports her pain is minimal   He denies any recent fevers or chills  Patient denies any drainage from her incision  Otherwise she feels that she is doing well in therapy      Review of Systems  Review of systems negative unless otherwise specified in HPI    Past Medical History  Past Medical History:   Diagnosis Date    Cardiac disease     History of transfusion     Hypertension     MI, old        Past Surgical History  Past Surgical History:   Procedure Laterality Date     SECTION      JOINT REPLACEMENT      right     AL TOTAL KNEE ARTHROPLASTY Left 2018    Procedure: ARTHROPLASTY KNEE TOTAL;  Surgeon: Brian Perez MD;  [facility];  Service: Orthopedics    TONSILLECTOMY      TOTAL KNEE ARTHROPLASTY Right        Current Medications  Current Outpatient Prescriptions on File Prior to Visit   Medication Sig Dispense Refill    acetaminophen (TYLENOL) 325 mg tablet Please take 650mg every 6 hours as needed for pain 30 tablet 0    ascorbic acid (VITAMIN C) 500 mg tablet Take 500 mg by mouth daily      docusate sodium (COLACE) 100 mg capsule Take 1 capsule (100 mg total) by mouth 2 (two) times a day 20 capsule 0    enoxaparin (LOVENOX) 40 mg/0 4 mL Inject 0 4 mL (40 mg total) under the skin every 24 hours  0    ferrous sulfate 325 (65 Fe) mg tablet Take 325 mg by mouth 2 (two) times a day with meals      folic acid (FOLVITE) 1 mg tablet Take by mouth daily      lisinopril (ZESTRIL) 5 mg tablet Take 5 mg by mouth daily        metoprolol succinate (TOPROL-XL) 25 mg 24 hr tablet Take 1 tablet by mouth daily      oxyCODONE (ROXICODONE) 5 mg immediate release tablet Take 1, or 2 pills po Q4 Hrs prn 60 tablet 0    traMADol (ULTRAM) 50 mg tablet Take 1 tablet (50 mg total) by mouth every 6 (six) hours as needed for moderate pain for up to 60 doses 60 tablet 0     No current facility-administered medications on file prior to visit  Recent Labs [facility])    0  Lab Value Date/Time   HCT 31 7 (L) 05/01/2018 0510   HCT 39 7 08/09/2015 2324   HGB 10 8 (L) 05/01/2018 0510   HGB 13 7 08/09/2015 2324   WBC 11 52 (H) 05/01/2018 0510   WBC 7 67 08/09/2015 2324   INR 0 99 04/11/2018 0945   INR 0 96 08/09/2015 2324   GLUCOSE 96 05/02/2018 0514   GLUCOSE 108 08/09/2015 2324   HGBA1C 5 4 04/11/2018 0945         Physical exam  · General: Awake, Alert, Oriented  · Eyes: Pupils equal, round and reactive to light  · Heart: regular rate and rhythm  · Lungs: No audible wheezing  · Abdomen: soft    Patient does have rolling walker however patient does have a well-healed left knee incision with staples in place  No surrounding erythema  Patient does have a mild effusion  Patient is able to maintain a straight leg raise  Patient has flexion up to 70  Patient does have some residual low leg swelling normal sensation  Palpable pulses distally  Imaging    X-rays left knee didn't restrict patient to have total knee prostheses in appropriate position  There is no evidence of fracture        Assessment/Plan:   [de-identified] y  o female Now days status post left total knee arthroplasty doing well    Continue weightbearing as tolerated left lower extremity  Advance to ambulating without rolling walker    Continue PT/OT  Lovenox continue  Pain control as needed patient does not need any refills for prescriptions follow-up in one week for staple removal

## 2018-05-10 ENCOUNTER — OFFICE VISIT (OUTPATIENT)
Dept: PHYSICAL THERAPY | Facility: CLINIC | Age: 83
End: 2018-05-10
Payer: COMMERCIAL

## 2018-05-10 DIAGNOSIS — Z96.652 AFTERCARE FOLLOWING LEFT KNEE JOINT REPLACEMENT SURGERY: ICD-10-CM

## 2018-05-10 DIAGNOSIS — Z47.1 AFTERCARE FOLLOWING LEFT KNEE JOINT REPLACEMENT SURGERY: ICD-10-CM

## 2018-05-10 DIAGNOSIS — M17.12 PRIMARY OSTEOARTHRITIS OF LEFT KNEE: Primary | ICD-10-CM

## 2018-05-10 DIAGNOSIS — Z96.652 S/P TOTAL KNEE ARTHROPLASTY, LEFT: ICD-10-CM

## 2018-05-10 PROCEDURE — 97140 MANUAL THERAPY 1/> REGIONS: CPT | Performed by: PHYSICAL THERAPIST

## 2018-05-10 PROCEDURE — 97110 THERAPEUTIC EXERCISES: CPT | Performed by: PHYSICAL THERAPIST

## 2018-05-10 PROCEDURE — 97112 NEUROMUSCULAR REEDUCATION: CPT | Performed by: PHYSICAL THERAPIST

## 2018-05-10 NOTE — PROGRESS NOTES
Daily Note     Today's date: 5/10/2018  Patient name: Gio Hernandez  : 1935  MRN: [id number]  Referring provider: Yuan Pitts MD  Dx:   No diagnosis found  Subjective: The patient reports mild to moderate pain today but feels her knee is stronger  The patient saw the surgeon who was happy with her progress and encouraged her to work on knee flexion and active SLR  The patient gets her staples removed next week  Objective: See treatment diary below      Assessment: Patient demonstrated increased tolerance to ROM exercises today  Significantly increased ROM noted during both manuals and TE today  Plan: Continue per plan of care  Progress treatment as tolerated          Precautions: HTN    Daily Treatment Diary     Manual  5/3 5/7 5/10          L Knee PROM  15' 10'                                                                  Exercise Diary  5/3 5/7 5/10          Quad Set 5"x10 10"x10 10"x15          Gastroc St  20"x2 20"x3 20"x3          Heel slides  10"x10 10"x10          Seated HS St    20"x3 20"x3          Seated Knee Flex St    10"x5 20"x5          Weight shift  5"x10 10"x10          Seated Marches   2x10          Supine marches   1x10                                                                                                                                                                          Modalities

## 2018-05-14 ENCOUNTER — APPOINTMENT (OUTPATIENT)
Dept: PHYSICAL THERAPY | Facility: CLINIC | Age: 83
End: 2018-05-14
Payer: COMMERCIAL

## 2018-05-15 ENCOUNTER — OFFICE VISIT (OUTPATIENT)
Dept: OBGYN CLINIC | Facility: HOSPITAL | Age: 83
End: 2018-05-15

## 2018-05-15 ENCOUNTER — APPOINTMENT (OUTPATIENT)
Dept: LAB | Facility: HOSPITAL | Age: 83
End: 2018-05-15
Attending: ORTHOPAEDIC SURGERY
Payer: COMMERCIAL

## 2018-05-15 VITALS
HEART RATE: 106 BPM | BODY MASS INDEX: 26.8 KG/M2 | HEIGHT: 64 IN | SYSTOLIC BLOOD PRESSURE: 127 MMHG | DIASTOLIC BLOOD PRESSURE: 82 MMHG | WEIGHT: 156.97 LBS

## 2018-05-15 DIAGNOSIS — Z96.652 AFTERCARE FOLLOWING LEFT KNEE JOINT REPLACEMENT SURGERY: ICD-10-CM

## 2018-05-15 DIAGNOSIS — Z96.652 AFTERCARE FOLLOWING LEFT KNEE JOINT REPLACEMENT SURGERY: Primary | ICD-10-CM

## 2018-05-15 DIAGNOSIS — R53.83 FATIGUE, UNSPECIFIED TYPE: ICD-10-CM

## 2018-05-15 DIAGNOSIS — Z47.1 AFTERCARE FOLLOWING LEFT KNEE JOINT REPLACEMENT SURGERY: Primary | ICD-10-CM

## 2018-05-15 DIAGNOSIS — Z47.1 AFTERCARE FOLLOWING LEFT KNEE JOINT REPLACEMENT SURGERY: ICD-10-CM

## 2018-05-15 LAB
HCT VFR BLD AUTO: 36.1 % (ref 34.8–46.1)
HGB BLD-MCNC: 11.8 G/DL (ref 11.5–15.4)

## 2018-05-15 PROCEDURE — 99024 POSTOP FOLLOW-UP VISIT: CPT | Performed by: ORTHOPAEDIC SURGERY

## 2018-05-15 PROCEDURE — 85014 HEMATOCRIT: CPT

## 2018-05-15 PROCEDURE — 36415 COLL VENOUS BLD VENIPUNCTURE: CPT

## 2018-05-15 PROCEDURE — 85018 HEMOGLOBIN: CPT

## 2018-05-15 NOTE — PROGRESS NOTES
[de-identified] y o female returns today 2 weeks s/p left TKA from 18  She denies any significant pain but extreme fatigue  No calf pain, fevers, or chills  She presents using a walker  Review of Systems  Review of systems negative unless otherwise specified in HPI    Past Medical History  Past Medical History:   Diagnosis Date    Cardiac disease     History of transfusion     Hypertension     MI, old        Past Surgical History  Past Surgical History:   Procedure Laterality Date     SECTION      JOINT REPLACEMENT      right     [place] TOTAL KNEE ARTHROPLASTY Left 2018    Procedure: ARTHROPLASTY KNEE TOTAL;  Surgeon: Evalyn Collet, MD;  [facility];  Service: Orthopedics    TONSILLECTOMY      TOTAL KNEE ARTHROPLASTY Right        Current Medications  Current Outpatient Prescriptions on File Prior to Visit   Medication Sig Dispense Refill    acetaminophen (TYLENOL) 325 mg tablet Please take 650mg every 6 hours as needed for pain 30 tablet 0    ascorbic acid (VITAMIN C) 500 mg tablet Take 500 mg by mouth daily      docusate sodium (COLACE) 100 mg capsule Take 1 capsule (100 mg total) by mouth 2 (two) times a day 20 capsule 0    enoxaparin (LOVENOX) 40 mg/0 4 mL Inject 0 4 mL (40 mg total) under the skin every 24 hours  0    ferrous sulfate 325 (65 Fe) mg tablet Take 325 mg by mouth 2 (two) times a day with meals      folic acid (FOLVITE) 1 mg tablet Take by mouth daily      lisinopril (ZESTRIL) 5 mg tablet Take 5 mg by mouth daily        metoprolol succinate (TOPROL-XL) 25 mg 24 hr tablet Take 1 tablet by mouth daily      oxyCODONE (ROXICODONE) 5 mg immediate release tablet Take 1, or 2 pills po Q4 Hrs prn 60 tablet 0    traMADol (ULTRAM) 50 mg tablet Take 1 tablet (50 mg total) by mouth every 6 (six) hours as needed for moderate pain for up to 60 doses 60 tablet 0     No current facility-administered medications on file prior to visit          Recent Labs (HCT,HGB,PT,INR,ESR,CRP,GLU,HgA1C)    0  Lab Value Date/Time   HCT 31 7 (L) 05/01/2018 0510   HCT 39 7 08/09/2015 2324   HGB 10 8 (L) 05/01/2018 0510   HGB 13 7 08/09/2015 2324   WBC 11 52 (H) 05/01/2018 0510   WBC 7 67 08/09/2015 2324   INR 0 99 04/11/2018 0945   INR 0 96 08/09/2015 2324   GLUCOSE 96 05/02/2018 0514   GLUCOSE 108 08/09/2015 2324   HGBA1C 5 4 04/11/2018 0945         Physical exam  · General: Awake, Alert, Oriented  · Eyes: Pupils equal, round and reactive to light  · Heart: regular rate and rhythm  · Lungs: No audible wheezing  · Abdomen: soft    Left knee:  Mild swelling, resolving warmth and ecchymosis  Mild erythema, no signs of infection) around her staples which are in and then will be removed today  Calf is soft and non-tender, no signs of DVT  ROM 5-90  Can extend her knee against gravity    Imaging      Procedure  Staples removed and ABD with elastic wrap applied       Assessment/Plan:   [de-identified] y  o female 2 weeks s/p left TKA, she is doing well and will get some labs to evaluate for possible anemia    Continue PT, ice and elevate  WBAT  Activities as tolerated  Re-check in 4 weeks unless needed sooner

## 2018-05-17 ENCOUNTER — OFFICE VISIT (OUTPATIENT)
Dept: PHYSICAL THERAPY | Facility: CLINIC | Age: 83
End: 2018-05-17
Payer: COMMERCIAL

## 2018-05-17 DIAGNOSIS — Z47.1 AFTERCARE FOLLOWING LEFT KNEE JOINT REPLACEMENT SURGERY: ICD-10-CM

## 2018-05-17 DIAGNOSIS — M17.12 PRIMARY OSTEOARTHRITIS OF LEFT KNEE: Primary | ICD-10-CM

## 2018-05-17 DIAGNOSIS — Z96.652 AFTERCARE FOLLOWING LEFT KNEE JOINT REPLACEMENT SURGERY: ICD-10-CM

## 2018-05-17 DIAGNOSIS — Z96.652 S/P TOTAL KNEE ARTHROPLASTY, LEFT: ICD-10-CM

## 2018-05-17 PROCEDURE — 97112 NEUROMUSCULAR REEDUCATION: CPT

## 2018-05-17 PROCEDURE — 97140 MANUAL THERAPY 1/> REGIONS: CPT

## 2018-05-17 PROCEDURE — 97110 THERAPEUTIC EXERCISES: CPT

## 2018-05-17 NOTE — PROGRESS NOTES
Daily Note     Today's date: 2018  Patient name: Vane Dixon  : 1935  MRN: [id number]  Referring provider: Kandice Garcia MD  Dx:   Encounter Diagnosis     ICD-10-CM    1  Primary osteoarthritis of left knee M17 12    2  Aftercare following left knee joint replacement surgery Z47 1     Z96 652    3  S/P total knee arthroplasty, left [id number]                   Subjective: Pt reports low pain levels in her left knee pre tx  Objective: See treatment diary below; L knee PROM: 5-92 degrees      Assessment: Patient demonstrated increased knee PROM, increased hip flexor strength with seated marches  Plan: Continue poc as per PT         Precautions: HTN    Daily Treatment Diary     Manual  5/3 5/7 5/10 5/17         L Knee PROM  15' 10' 10'                                                                 Exercise Diary  5/3 5/7 5/10 5/17         Quad Set 5"x10 10"x10 10"x15 10"x15         Gastroc St  20"x2 20"x3 20"x3 20"x3         Heel slides  10"x10 10"x10 10"x10         Seated HS St    20"x3 20"x3 20"x3         Seated Knee Flex St    10"x5 20"x5 20"x5         Weight shift  5"x10 10"x10          Seated Marches   2x10 2x10         Supine marches   1x10 1x10                                                                                                                                                                         Modalities

## 2018-05-18 ENCOUNTER — APPOINTMENT (EMERGENCY)
Dept: CT IMAGING | Facility: HOSPITAL | Age: 83
End: 2018-05-18
Payer: COMMERCIAL

## 2018-05-18 ENCOUNTER — APPOINTMENT (EMERGENCY)
Dept: ULTRASOUND IMAGING | Facility: HOSPITAL | Age: 83
End: 2018-05-18
Payer: COMMERCIAL

## 2018-05-18 ENCOUNTER — HOSPITAL ENCOUNTER (EMERGENCY)
Facility: HOSPITAL | Age: 83
Discharge: HOME | End: 2018-05-18
Attending: EMERGENCY MEDICINE
Payer: COMMERCIAL

## 2018-05-18 VITALS
BODY MASS INDEX: 26.98 KG/M2 | WEIGHT: 158 LBS | OXYGEN SATURATION: 99 % | DIASTOLIC BLOOD PRESSURE: 70 MMHG | RESPIRATION RATE: 18 BRPM | SYSTOLIC BLOOD PRESSURE: 144 MMHG | TEMPERATURE: 98.1 F | HEIGHT: 64 IN | HEART RATE: 101 BPM

## 2018-05-18 DIAGNOSIS — I82.4Z9: Primary | ICD-10-CM

## 2018-05-18 PROCEDURE — 99284 EMERGENCY DEPT VISIT MOD MDM: CPT

## 2018-05-18 PROCEDURE — 71275 CT ANGIOGRAPHY CHEST: CPT

## 2018-05-18 PROCEDURE — 96360 HYDRATION IV INFUSION INIT: CPT

## 2018-05-18 PROCEDURE — 93970 EXTREMITY STUDY: CPT

## 2018-05-18 RX ADMIN — RIVAROXABAN 15 MG: 15 TABLET, FILM COATED ORAL at 22:31

## 2018-05-18 RX ADMIN — IOHEXOL 85 ML: 350 INJECTION, SOLUTION INTRAVENOUS at 21:09

## 2018-05-18 RX ADMIN — SODIUM CHLORIDE 1000 ML: 0.9 INJECTION, SOLUTION INTRAVENOUS at 21:00

## 2018-05-19 PROCEDURE — 93970 EXTREMITY STUDY: CPT | Performed by: SURGERY

## 2018-05-19 NOTE — ED PROVIDER NOTES
History  Chief Complaint   Patient presents with    Fatigue     Pt c/o fatigue  Left knee sx on   Pt c/o fatigue to surgeon, took off lovenox  C/o continued fatigue to PCP, d dimer elevated (post lovenox d/c)  Referred here by PCP   Abnormal Lab       History provided by:  Patient   used: No     42-year-old female sent by family physician for evaluation of an elevated D-dimer in the setting of having knee replacement 2-3 weeks ago     Only symptom the patient has been having is fatigue overall  She had been on Lovenox prophylactically and this was discontinued thinking maybe it was related to her fatigue  Patient denies any knee pain, calf pain or swelling  No shortness of breath, chest pain  Other labs are normal   No anemia  Normal renal function  Doubt symptoms are related but will check duplex to rule out DVT  Will also CT chest to rule out PE  Prior to Admission Medications   Prescriptions Last Dose Informant Patient Reported?  Taking?   ascorbic acid (VITAMIN C) 500 mg tablet   Yes Yes   Sig: Take 500 mg by mouth daily   ferrous sulfate 325 (65 Fe) mg tablet   Yes Yes   Sig: Take 325 mg by mouth 2 (two) times a day with meals   folic acid (FOLVITE) 1 mg tablet   Yes Yes   Sig: Take by mouth daily   lisinopril (ZESTRIL) 5 mg tablet   Yes Yes   Sig: Take 5 mg by mouth daily     metoprolol succinate (TOPROL-XL) 25 mg 24 hr tablet   Yes Yes   Sig: Take 1 tablet by mouth daily      Facility-Administered Medications: None       Past Medical History:   Diagnosis Date    Cardiac disease     History of transfusion     Hypertension     MI, old        Past Surgical History:   Procedure Laterality Date     SECTION      JOINT REPLACEMENT      right     IN TOTAL KNEE ARTHROPLASTY Left 2018    Procedure: ARTHROPLASTY KNEE TOTAL;  Surgeon: Brian Perez MD;  [facility]: [facility];  Service: Orthopedics    TONSILLECTOMY      TOTAL KNEE ARTHROPLASTY Right        Family History   Problem Relation Age of Onset    Cancer Sister      I have reviewed and agree with the history as documented  Social History   Substance Use Topics    Smoking status: Never Smoker    Smokeless tobacco: Never Used    Alcohol use No        Review of Systems   Constitutional: Positive for activity change and fatigue  Negative for appetite change  Respiratory: Negative for chest tightness and shortness of breath  Cardiovascular: Negative for chest pain, palpitations and leg swelling  Gastrointestinal: Negative for abdominal pain and vomiting  Musculoskeletal: Negative for back pain, gait problem and neck pain  Neurological: Negative for dizziness, weakness, light-headedness and headaches  All other systems reviewed and are negative  Physical Exam  Physical Exam   Constitutional: She is oriented to person, place, and time  She appears well-developed and well-nourished  HENT:   Head: Normocephalic  Mouth/Throat: Oropharynx is clear and moist    Neck: Normal range of motion  Neck supple  Cardiovascular: Normal rate, regular rhythm and intact distal pulses  Pulmonary/Chest: Effort normal and breath sounds normal    Musculoskeletal: Normal range of motion  She exhibits no edema or tenderness  Neurological: She is alert and oriented to person, place, and time  Skin: Skin is warm and dry  Psychiatric: She has a normal mood and affect  Her behavior is normal    Nursing note and vitals reviewed        Vital Signs  ED Triage Vitals [05/18/18 2016]   Temperature Pulse Respirations Blood Pressure SpO2   98 1 °F (36 7 °C) 95 18 133/79 99 %      Temp Source Heart Rate Source Patient Position - Orthostatic VS BP Location FiO2 (%)   Oral Monitor Sitting Left arm --      Pain Score       No Pain           Vitals:    05/18/18 2016 05/18/18 2234   BP: 133/79 144/70   Pulse: 95 101   Patient Position - Orthostatic VS: Sitting Lying       Visual Acuity      ED Medications  Medications sodium chloride 0 9 % bolus 1,000 mL (0 mL Intravenous Stopped 5/18/18 2228)   iohexol (OMNIPAQUE) 350 MG/ML injection (MULTI-DOSE) 85 mL (85 mL Intravenous Given 5/18/18 2109)   rivaroxaban (XARELTO) tablet 15 mg (15 mg Oral Given 5/18/18 2231)       Diagnostic Studies  Results Reviewed     None                 VAS lower limb venous duplex study, complete bilateral   ED Interpretation by Yary Howard MD (05/18 2218)   DVT left niraj vein      Final Result by Rekha Schwartz DO (05/19 0840)      CTA ED chest PE study   Final Result by Yessi Cantu MD (05/18 2126)      No acute CT findings  Nonspecific right hilar and right infrahilar prominent lymph nodes  Workstation performed: [id number]                    Procedures  Procedures       Phone Contacts  ED Phone Contact    ED Course                               MDM  Number of Diagnoses or Management Options  Calf DVT (deep venous thrombosis) [facility]):   Diagnosis management comments: 51-year-old female with symptoms of fatigue after knee surgery, seen by PCP the with labs, elevated D-dimer sent for further evaluation  Duplex positive for small clot in the soleal vein  PE scan negative  Patient started on Xarelto and will follow up with PCP  Doubt this is the etiology of her fatigue  May simply be postsurgical, rehab          Amount and/or Complexity of Data Reviewed  Clinical lab tests: reviewed  Tests in the radiology section of CPT®: ordered and reviewed    Patient Progress  Patient progress: stable    CritCare Time    Disposition  Final diagnoses:   Calf DVT (deep venous thrombosis) ([address] )     Time reflects when diagnosis was documented in both MDM as applicable and the Disposition within this note     Time User Action Codes Description Comment    5/18/2018 10:21 PM Soni HAJI Add [I82 4Z9] Calf DVT (deep venous thrombosis) [facility])       ED Disposition     ED Disposition Condition Comment    Discharge  Abhi Bazan discharge to home/self care  Condition at discharge: Good        Follow-up Information     Follow up With Specialties Details Why Contact Info Additional Norma 189, DO    [address] [facility] [id number] [phone]       [address] [facility] Emergency Medicine  If symptoms worsen [address] [facility] [address] [phone] [facility], [address], [place], [place], [address]          Discharge Medication List as of 5/18/2018 10:22 PM      START taking these medications    Details   rivaroxaban (XARELTO) 15 & 20 MG starter pack Take 15 mg by mouth twice daily for 21 days, then 20 mg once daily thereafter , Print         CONTINUE these medications which have NOT CHANGED    Details   ascorbic acid (VITAMIN C) 500 mg tablet Take 500 mg by mouth daily, Historical Med      ferrous sulfate 325 (65 Fe) mg tablet Take 325 mg by mouth 2 (two) times a day with meals, Historical Med      folic acid (FOLVITE) 1 mg tablet Take by mouth daily, Historical Med      lisinopril (ZESTRIL) 5 mg tablet Take 5 mg by mouth daily  , Historical Med      metoprolol succinate (TOPROL-XL) 25 mg 24 hr tablet Take 1 tablet by mouth daily, Starting Fri 6/9/2017, Historical Med           No discharge procedures on file      ED Provider  Electronically Signed by           Ortiz Weiss MD  05/21/18 6706

## 2018-05-19 NOTE — DISCHARGE INSTRUCTIONS
Deep Venous Thrombosis   WHAT YOU NEED TO KNOW:   Deep venous thrombosis (DVT) is a blood clot that forms in a deep vein of the body  The deep veins in the legs, thighs, and hips are the most common sites for DVT  DVT can also occur in a deep vein within your arms  The clot prevents the normal flow of blood in the vein  The blood backs up and causes pain and swelling  The DVT can break into smaller pieces and travel to your lungs and cause a blockage called a pulmonary embolism  A pulmonary embolism can become life-threatening  DISCHARGE INSTRUCTIONS:   Call 911 for any of the following:   · You feel lightheaded, short of breath, and have chest pain  · You cough up blood  Return to the emergency department if:   · Your symptoms get worse  · You develop new DVT symptoms in another leg or arm  Contact your healthcare provider if:   · Your gums or nose bleed  · You see blood in your urine or bowel movements  · Your bowel movements are black or darker than normal     · You have questions or concerns about your conditions or care  Medicines:   · Blood thinners  help prevent the DVT from getting bigger and prevent new clots from forming  Examples of blood thinners include heparin, rivaroxaban, apixiban, and warfarin  The following are general safety guidelines to follow while you are taking a blood thinner:     ¨ Watch for bleeding and bruising  Watch for bleeding from your gums or nose  Watch for blood in your urine and bowel movements  Use a soft washcloth on your skin, and a soft toothbrush to brush your teeth  This can keep your skin and gums from bleeding  If you shave, use an electric shaver  Do not play contact sports  ¨ Tell your dentist and other healthcare providers that you take a blood thinner  Wear a bracelet or necklace that says you take this medicine  ¨ Do not start or stop any medicines unless your healthcare provider tells you to   Many medicines cannot be used with blood thinners  ¨ Tell your healthcare provider right away if you forget to take the blood thinner , or if you take too much  ¨ Warfarin  is a blood thinner that you may need to take  The following are additional things you should be aware of if you take warfarin:    § Foods and medicines can affect the amount of warfarin in your blood  Do not make major changes to your diet  Warfarin works best when you eat about the same amount of vitamin K every day  Vitamin K is found in green leafy vegetables and certain other foods  Ask for more information about what to eat or not to eat  § You will need to see your healthcare provider for follow-up visits  You will need regular blood tests to decide how much warfarin you need  · Take your medicine as directed  Contact your healthcare provider if you think your medicine is not helping or if you have side effects  Tell him or her if you are allergic to any medicine  Keep a list of the medicines, vitamins, and herbs you take  Include the amounts, and when and why you take them  Bring the list or the pill bottles to follow-up visits  Carry your medicine list with you in case of an emergency  Manage your DVT:   · Wear pressure stockings  The stockings are tight and put pressure on your legs  This improves blood flow and helps prevent clots  Wear the stockings during the day  Do not wear them when you sleep  · Elevate your legs  above the level of your heart as often as you can  This will help decrease swelling and pain  Prop your legs on pillows or blankets to keep them elevated comfortably  · Exercise regularly  to help increase your blood flow  Walking is a good low-impact exercise  Talk to your healthcare provider about the best exercise plan for you  · Change body positions often  If you travel by car or work at a desk, move and stretch in your seat several times each hour  In an airplane, get up and walk every hour   If you are bedridden, ask for help to change your position every 1 to 2 hours  · Maintain a healthy weight  Ask your healthcare provider how much you should weigh  Ask him to help you create a weight loss plan if you are overweight  · Do not smoke  Nicotine and other chemicals in cigarettes and cigars can damage blood vessels and make it more difficult to manage your DVT  Ask your healthcare provider for information if you currently smoke and need help to quit  E-cigarettes or smokeless tobacco still contain nicotine  Talk to your healthcare provider before you use these products  Follow up with your healthcare provider as directed:  Write down your questions so you remember to ask them during your visits  © 2017 [address] Information is for End User's use only and may not be sold, redistributed or otherwise used for commercial purposes  All illustrations and images included in CareNotes® are the copyrighted property of [employer] , [employer]  or [employer]  The above information is an  only  It is not intended as medical advice for individual conditions or treatments  Talk to your doctor, nurse or pharmacist before following any medical regimen to see if it is safe and effective for you

## 2018-05-21 ENCOUNTER — APPOINTMENT (OUTPATIENT)
Dept: PHYSICAL THERAPY | Facility: CLINIC | Age: 83
End: 2018-05-21
Payer: COMMERCIAL

## 2018-05-24 ENCOUNTER — TELEPHONE (OUTPATIENT)
Dept: OBGYN CLINIC | Facility: HOSPITAL | Age: 83
End: 2018-05-24

## 2018-05-24 ENCOUNTER — APPOINTMENT (OUTPATIENT)
Dept: PHYSICAL THERAPY | Facility: CLINIC | Age: 83
End: 2018-05-24
Payer: COMMERCIAL

## 2018-05-24 NOTE — TELEPHONE ENCOUNTER
Call placed to patient for post-op follow up- spoke with pt  Pt reporting she doesn't have "pain", it is "more that the knee is sore"  Pt denies any use of pain medication at this time (oxy, tramadol, ibuprofen)  Pt reporting she is ambulating with a cane, she denies falls  Pt reporting her BM schedule is back to normal   Pt denying CP/SOB/dizziness/fevers/calf pain  Pt c/o "just being so fatigued"  Pt reports that she saw Dr Hernan Lewis for her post-op follow up  Pt reporting "I was exhausted and I suggested that the blood thinner was making me tired- Joann did a blood test and said it would be OK to stop taking it"  Pt reporting she stopped taking her lovenox after F/U with surgeon, then went to see her PCP because she was still feeling so exhausted  Per Pt, "my PCP did a slew of tests, something came back positive so she sent me to the ED"  Pt was [facility] at [facility] on 5/18 and dx with "DVT left niraj vein"  Pt was then started on "rivaroxaban (XARELTO) 15 & 20 MG starter pack Take 15 mg by mouth twice daily for 21 days, then 20 mg once daily thereafter " and DCd to home  Pt confirming she is indeed taking her xarelto daily as prescribed  Pt reporting that her last PT visit was 5/17- Pt is agreeable to return back to PT next week if approved by PCP  Pt's DVT is being managed by her PCP- I left a message for Dr Blossom Rey regarding if patient can return back to post-op PT next week  Per Dr Kita Martinez office, she is out of the office until Tuesday 5/29  I will follow up next week  I notified pt that Dr Blossom Rey is out of office until Tuesday and that I need her OK to get pt set up with outpt PT appt for next week  I told pt I would call her next week after I speak with her PCP  Pt is reporting "I am feeling a little better today"  Pt is currently denying any questions/concerns at this time

## 2018-05-29 ENCOUNTER — TELEPHONE (OUTPATIENT)
Dept: OBGYN CLINIC | Facility: HOSPITAL | Age: 83
End: 2018-05-29

## 2018-05-29 ENCOUNTER — APPOINTMENT (OUTPATIENT)
Dept: PHYSICAL THERAPY | Facility: CLINIC | Age: 83
End: 2018-05-29
Payer: COMMERCIAL

## 2018-05-29 NOTE — TELEPHONE ENCOUNTER
Given pt's recent DVT dx, I left message with Dr Rachael Bernal office on Thursday 5/24 asking for clearance from PCP for pt to return back to outpt PT  Per Darnell Arriazaer PAC, pt's PCP is managing DVT and therefore needs to provide clearance to resume post-op PT  I received a VM from Dr Rachael Bernal office stating that the surgeon needs to clear pt to resume PT  I called Dr Rachael Bernal office back explaining that since PCP is managing pt's DVT, she needs to provide clearance for PT  Awaiting call back regarding this

## 2018-05-31 ENCOUNTER — TELEPHONE (OUTPATIENT)
Dept: OBGYN CLINIC | Facility: HOSPITAL | Age: 83
End: 2018-05-31

## 2018-05-31 ENCOUNTER — APPOINTMENT (OUTPATIENT)
Dept: PHYSICAL THERAPY | Facility: CLINIC | Age: 83
End: 2018-05-31
Payer: COMMERCIAL

## 2018-05-31 NOTE — TELEPHONE ENCOUNTER
Call placed to F/U with PCP's office regarding status of clearance for outpt PT  Per Ria Carnes at Dr Jong Carey office, it will be discussed with pt today at her follow up appt  PCP F/U appt today 5/31 @1100  I requested call back from office with final decision

## 2018-05-31 NOTE — TELEPHONE ENCOUNTER
I received call back from Dr Javy Em office  I spoke with Leighann Seo  Per Leighann Seo, Dr Kristie Pretty did clear pt to go back to PT however "she is unsure if she is going to return back"  My fax number provided to fax over proof of PT clearance  I spoke with pt  Pt reporting that she is agreeable to start PT back up "next week though, Today is obviously out and tomorrow won't work"  Pt requesting either [facility] or [facility]  I advised that the [place] site is a little easier to get into the building, less walking- pt reporting she would like the [facility]  Pt's call was transferred to [facility] with PT to schedule patient a post-op PT appt next week at [place]

## 2018-06-03 DIAGNOSIS — I10 ESSENTIAL HYPERTENSION: Primary | ICD-10-CM

## 2018-06-03 RX ORDER — METOPROLOL SUCCINATE 25 MG/1
TABLET, EXTENDED RELEASE ORAL
Qty: 90 TABLET | Refills: 3 | Status: SHIPPED | OUTPATIENT
Start: 2018-06-03 | End: 2018-06-25

## 2018-06-05 ENCOUNTER — EVALUATION (OUTPATIENT)
Dept: PHYSICAL THERAPY | Facility: CLINIC | Age: 83
End: 2018-06-05
Payer: COMMERCIAL

## 2018-06-05 DIAGNOSIS — Z96.652 AFTERCARE FOLLOWING LEFT KNEE JOINT REPLACEMENT SURGERY: Primary | ICD-10-CM

## 2018-06-05 DIAGNOSIS — M17.12 PRIMARY OSTEOARTHRITIS OF LEFT KNEE: ICD-10-CM

## 2018-06-05 DIAGNOSIS — Z96.652 S/P TOTAL KNEE ARTHROPLASTY, LEFT: ICD-10-CM

## 2018-06-05 DIAGNOSIS — Z47.1 AFTERCARE FOLLOWING LEFT KNEE JOINT REPLACEMENT SURGERY: Primary | ICD-10-CM

## 2018-06-05 PROCEDURE — 97110 THERAPEUTIC EXERCISES: CPT | Performed by: PHYSICAL THERAPIST

## 2018-06-05 PROCEDURE — 97140 MANUAL THERAPY 1/> REGIONS: CPT | Performed by: PHYSICAL THERAPIST

## 2018-06-05 PROCEDURE — G8978 MOBILITY CURRENT STATUS: HCPCS | Performed by: PHYSICAL THERAPIST

## 2018-06-05 PROCEDURE — G8979 MOBILITY GOAL STATUS: HCPCS | Performed by: PHYSICAL THERAPIST

## 2018-06-05 PROCEDURE — 97112 NEUROMUSCULAR REEDUCATION: CPT | Performed by: PHYSICAL THERAPIST

## 2018-06-05 NOTE — PROGRESS NOTES
PT Re-Evaluation     Today's date: 2018  Patient name: Gio Hernandez  : 1935  MRN: [id number]  Referring provider: Yuan Pitts MD  Dx:   Encounter Diagnosis     ICD-10-CM    1  Aftercare following left knee joint replacement surgery Z47 1     Z96 652    2  S/P total knee arthroplasty, left Z96 652    3  Primary osteoarthritis of left knee M17 12                   Assessment  Impairments: abnormal gait, abnormal muscle tone, abnormal or restricted ROM, impaired balance, impaired physical strength, pain with function and weight-bearing intolerance    Assessment details: The patient returns after a month long break from OPPT due to a blood clot and severe fatigue  The patient has transitioned from the  to a [facility] and reports overall less pain  Although the patient does demonstrate increased ROM, strength, and function, she is lacking appropriate strength and extension ROM for five weeks post-surgery  The patient would benefit from re-initiating therapy to address her deficits and meet her goals  Understanding of Dx/Px/POC: fair   Prognosis: poor    Goals  STG: To be completed in 4 weeks  1  The patient will increase knee ROM to 90 degrees when sitting in a chair  met  2  The patient will increase LE MMT to 4/5 MMT when ascending/descending stairs  Partially met  3  The patient will report no more than 4/10 pain during all adls  met    LTG: To be completed in 12 weeks    1  The patient will report no more than 1/10 pain during all adls  2  The patient will increase knee flexion to 120 degrees when squatting to pick an object off the floor  3  The patient will increase LE strength to 5/5 MMT when ascending/descending stairs         Plan  Patient would benefit from: skilled PT and skilled physical therapy  Planned modality interventions: thermotherapy: hydrocollator packs and cryotherapy  Other planned modality interventions: high level laser  Planned therapy interventions: joint mobilization, manual therapy, neuromuscular re-education, patient education, self care, strengthening, stretching, balance, balance/weight bearing training, therapeutic exercise, therapeutic activities, home exercise program and gait training  Frequency: 2x week  Duration in weeks: 8  Treatment plan discussed with: patient and family  Plan details: POC expires 18        Subjective Evaluation    History of Present Illness  Date of surgery: 2018  Mechanism of injury: surgery  Mechanism of injury: Rafael Malhotra is a [de-identified] y o  female who presents s/p left TKA  The patient denies parasthesias or trouble sleeping at night  The patient currently struggles with standing, walking, and stairs  PMH includes includes HTN  Not a recurrent problem   Quality of life: fair    Pain  Current pain rating: 3  At best pain ratin  At worst pain ratin  Quality: dull ache and throbbing  Aggravating factors: walking, standing and stair climbing  Progression: improved    Social Support  Steps to enter house: yes  Stairs in house: no   Lives in: [place] house  Lives with: spouse    Employment status: not working  Patient Goals  Patient goals for therapy: decreased pain and independence with ADLs/IADLs          Objective     Observations   Left Knee   Positive for edema  Negative for incision       Active Range of Motion   Left Knee   Flexion: 100 degrees with pain  Extension: 9 degrees with pain    Right Knee   Normal active range of motion  Flexion: 125 degrees   Extension: 0 degrees     Strength/Myotome Testing     Left Hip   Planes of Motion   Flexion: 3  Abduction: 3  Adduction: 3    Right Hip   Planes of Motion   Flexion: 4+  Abduction: 4+  Adduction: 4+    Left Knee   Flexion: 3  Extension: 3  Quadriceps contraction: poor    Right Knee   Flexion: 4+  Extension: 4+    Swelling     Left Knee Girth Measurement (cm)   Joint line: 43 cm    Right Knee Girth Measurement (cm)   Joint line: 37 cm    Ambulation   Weight-Bearing Status Weight-Bearing Status (Left): weight-bearing as tolerated   Weight-Bearing Status (Right): full weight-bearing    Assistive device used: single point cane      Precautions: HTN    Daily Treatment Diary     Manual  5/3 5/7 5/10 5/17 6/5        L Knee PROM  15' 10' 10' 10'                                                                Exercise Diary  5/3 5/7 5/10 5/17 6/5        Quad Set 5"x10 10"x10 10"x15 10"x15 10"x15        Gastroc St  20"x2 20"x3 20"x3 20"x3 20"x4        Heel slides  10"x10 10"x10 10"x10 10"x15        Seated HS St    20"x3 20"x3 20"x3 20"x4        Seated Knee Flex St    10"x5 20"x5 20"x5 20"x4        Weight shift  5"x10 10"x10  np        Seated Marches   2x10 2x10 D/C        Supine marches   1x10 1x10 D/C        SLR     2x10        SAQ     2x10        Ball Squeeze     5"x20                                                                                                                                 Modalities

## 2018-06-07 ENCOUNTER — APPOINTMENT (OUTPATIENT)
Dept: PHYSICAL THERAPY | Facility: CLINIC | Age: 83
End: 2018-06-07
Payer: COMMERCIAL

## 2018-06-19 NOTE — PROGRESS NOTES
6/19/18: The patient has only attended five visits of OPPT since her evaluation seven weeks ago  She has not returned to OPPT in two weeks and has cancelled all her appointments  The patient has declined to make additional appointments when called twice over the past two weeks  The patient is discharged due to non-compliance

## 2018-06-20 ENCOUNTER — OFFICE VISIT (OUTPATIENT)
Dept: OBGYN CLINIC | Facility: HOSPITAL | Age: 83
End: 2018-06-20

## 2018-06-20 VITALS
DIASTOLIC BLOOD PRESSURE: 83 MMHG | SYSTOLIC BLOOD PRESSURE: 133 MMHG | BODY MASS INDEX: 26.98 KG/M2 | HEART RATE: 83 BPM | HEIGHT: 64 IN | WEIGHT: 158 LBS

## 2018-06-20 DIAGNOSIS — Z96.652 AFTERCARE FOLLOWING LEFT KNEE JOINT REPLACEMENT SURGERY: Primary | ICD-10-CM

## 2018-06-20 DIAGNOSIS — Z47.1 AFTERCARE FOLLOWING LEFT KNEE JOINT REPLACEMENT SURGERY: Primary | ICD-10-CM

## 2018-06-20 PROCEDURE — 99024 POSTOP FOLLOW-UP VISIT: CPT | Performed by: ORTHOPAEDIC SURGERY

## 2018-06-20 RX ORDER — RIVAROXABAN 15 MG/1
TABLET, FILM COATED ORAL
Refills: 0 | COMMUNITY
Start: 2018-05-19 | End: 2018-11-07

## 2018-06-20 RX ORDER — RIVAROXABAN 20 MG/1
TABLET, FILM COATED ORAL
Refills: 2 | COMMUNITY
Start: 2018-05-31 | End: 2018-11-07

## 2018-06-20 NOTE — PROGRESS NOTES
[de-identified] y o female returns today and is now 6 weeks s/p left TKA and doing well  Since her last visit she has had more good days that bad days in relation to her fatigue  She is attending PT  She present with a SPC  Denies any calf pain  Review of Systems  Review of systems negative unless otherwise specified in HPI    Past Medical History  Past Medical History:   Diagnosis Date    Cardiac disease     History of transfusion     Hypertension     MI, old        Past Surgical History  Past Surgical History:   Procedure Laterality Date     SECTION      JOINT REPLACEMENT      right     HI TOTAL KNEE ARTHROPLASTY Left 2018    Procedure: ARTHROPLASTY KNEE TOTAL;  Surgeon: Juvencio Davenport MD;  [facility];  Service: Orthopedics    TONSILLECTOMY      TOTAL KNEE ARTHROPLASTY Right        Current Medications  Current Outpatient Prescriptions on File Prior to Visit   Medication Sig Dispense Refill    ascorbic acid (VITAMIN C) 500 mg tablet Take 500 mg by mouth daily      ferrous sulfate 325 (65 Fe) mg tablet Take 325 mg by mouth 2 (two) times a day with meals      folic acid (FOLVITE) 1 mg tablet Take by mouth daily      lisinopril (ZESTRIL) 5 mg tablet Take 5 mg by mouth daily        metoprolol succinate (TOPROL-XL) 25 mg 24 hr tablet TAKE 1 TABLET ONCE DAILY  90 tablet 3    rivaroxaban (XARELTO) 15 & 20 MG starter pack Take 15 mg by mouth twice daily for 21 days, then 20 mg once daily thereafter  51 each 0     No current facility-administered medications on file prior to visit          Recent Labs [facility])    0  Lab Value Date/Time   HCT 36 1 05/15/2018 1314   HCT 39 7 2015 2324   HGB 11 8 05/15/2018 1314   HGB 13 7 2015 2324   WBC 11 52 (H) 2018 0510   WBC 7 67 2015 2324   INR 0 99 2018 0945   INR 0 96 2015 2324   GLUCOSE 96 2018 0514   GLUCOSE 108 2015 2324   HGBA1C 5 4 2018 0945         Physical exam  · General: Awake, Alert, Oriented  · Eyes: Pupils equal, round and reactive to light  · Heart: regular rate and rhythm  · Lungs: No audible wheezing  · Abdomen: soft    Left knee:  Neutral alignment, mild diffuse swelling which is resolving  No signs of infection   AROM 3-105, passively almost to 0 and flexed to 110  Stable to varus and valgus stress  Calf is soft and non-tender without signs of DVT  NVID      Imaging  None performed    Procedure  None performed    Assessment/Plan:   [de-identified] y  o female 6 weeks s/p Left TKA from 4/30/18     She is doing well and recommend to continue with PT and HEP  Activities as tolerated  WBAT  Total knee precautions  Re-check in 6 weeks with repeat x-rays

## 2018-06-25 DIAGNOSIS — I10 ESSENTIAL HYPERTENSION: ICD-10-CM

## 2018-07-31 RX ORDER — NITROFURANTOIN 25; 75 MG/1; MG/1
CAPSULE ORAL
Refills: 0 | COMMUNITY
Start: 2018-06-30 | End: 2018-11-07

## 2018-08-01 ENCOUNTER — OFFICE VISIT (OUTPATIENT)
Dept: OBGYN CLINIC | Facility: HOSPITAL | Age: 83
End: 2018-08-01

## 2018-08-01 ENCOUNTER — HOSPITAL ENCOUNTER (OUTPATIENT)
Dept: RADIOLOGY | Facility: HOSPITAL | Age: 83
Discharge: HOME | End: 2018-08-01
Attending: ORTHOPAEDIC SURGERY
Payer: COMMERCIAL

## 2018-08-01 VITALS
HEART RATE: 76 BPM | WEIGHT: 158 LBS | BODY MASS INDEX: 26.98 KG/M2 | HEIGHT: 64 IN | SYSTOLIC BLOOD PRESSURE: 133 MMHG | DIASTOLIC BLOOD PRESSURE: 78 MMHG

## 2018-08-01 DIAGNOSIS — Z47.1 AFTERCARE FOLLOWING LEFT KNEE JOINT REPLACEMENT SURGERY: Primary | ICD-10-CM

## 2018-08-01 DIAGNOSIS — Z47.1 AFTERCARE FOLLOWING LEFT KNEE JOINT REPLACEMENT SURGERY: ICD-10-CM

## 2018-08-01 DIAGNOSIS — Z96.652 AFTERCARE FOLLOWING LEFT KNEE JOINT REPLACEMENT SURGERY: ICD-10-CM

## 2018-08-01 DIAGNOSIS — Z96.652 AFTERCARE FOLLOWING LEFT KNEE JOINT REPLACEMENT SURGERY: Primary | ICD-10-CM

## 2018-08-01 PROCEDURE — 99024 POSTOP FOLLOW-UP VISIT: CPT | Performed by: ORTHOPAEDIC SURGERY

## 2018-08-01 PROCEDURE — 73560 X-RAY EXAM OF KNEE 1 OR 2: CPT

## 2018-08-01 NOTE — PROGRESS NOTES
Assessment:  1  Aftercare following left knee joint replacement surgery  XR knee 1 or 2 vw left       Plan:  3 months s/p left TKA, doing well  Total knee precautions  Maintain strengthening exercises  Activities as tolerated  Antibiotics as discussed  WBAT    To do next visit:  Return in about 3 months (around 2018) for re-check with x-rays  Scribe Attestation    I,:   Danna Huston am acting as a scribe while in the presence of the attending physician :        I,:   Romain Go MD personally performed the services described in this documentation    as scribed in my presence :              Subjective: Amy Diehl is a [de-identified] y o  female who presents 3 months s/p left TKA  She struggled post-op for 7 weeks with fatigue however a few days after her last visit she turned the corner for good and is pleased today  She feels great and has been maintaining her HEP  Denies any knee pain  No calf pain  Review of systems negative unless otherwise specified in HPI      Past Medical History:   Diagnosis Date    Cardiac disease     History of transfusion     Hypertension     MI, old        Past Surgical History:   Procedure Laterality Date     SECTION      JOINT REPLACEMENT      right     [place] TOTAL KNEE ARTHROPLASTY Left 2018    Procedure: ARTHROPLASTY KNEE TOTAL;  Surgeon: Romain Go MD;  [facility];  Service: Orthopedics    TONSILLECTOMY      TOTAL KNEE ARTHROPLASTY Right        Family History   Problem Relation Age of Onset    Cancer Sister        Social History     Occupational History    Not on file       Social History Main Topics    Smoking status: Never Smoker    Smokeless tobacco: Never Used    Alcohol use No    Drug use: No    Sexual activity: Not on file         Current Outpatient Prescriptions:     ascorbic acid (VITAMIN C) 500 mg tablet, Take 500 mg by mouth daily, Disp: , Rfl:     ferrous sulfate 325 (65 Fe) mg tablet, Take 325 mg by mouth 2 (two) times a day with meals, Disp: , Rfl:     folic acid (FOLVITE) 1 mg tablet, Take by mouth daily, Disp: , Rfl:     lisinopril (ZESTRIL) 5 mg tablet, Take 5 mg by mouth daily  , Disp: , Rfl:     nitrofurantoin (MACROBID) 100 mg capsule, TAKE 1 CAPSULE BY MOUTH TWICE A DAY FOR 7 DAYS, Disp: , Rfl: 0    rivaroxaban (XARELTO) 15 & 20 MG starter pack, Take 15 mg by mouth twice daily for 21 days, then 20 mg once daily thereafter , Disp: 51 each, Rfl: 0    XARELTO 15 MG tablet, TAKE 1 TABLET (15 MG) BY MOUTH TWICE A DAY FOR 21 DAYS, THEN TAKE 20 MG ONCE DAILY THEREAFTER, Disp: , Rfl: 0    XARELTO 20 MG tablet, Take 1 tablet (20 mg total) by mouth daily with dinner  To start after 15mg tabLET complete, Disp: , Rfl: 2    No Known Allergies         Vitals:    08/01/18 1411   BP: 133/78   Pulse: 76       Objective:          Physical Exam                    Left Knee Exam     Tenderness   The patient is experiencing no tenderness  Range of Motion   The patient has normal left knee ROM  Left knee flexion: patella tracks well  Tests   Varus: negative  Valgus: negative    Other   Sensation: normal  Swelling: none  Effusion: no effusion present    Comments:    neurtral alignment  Well healed anterior incision  NVID            Diagnostics, reviewed and taken today if performed as documented: The attending physician has personally reviewed the pertinent films in PACS and interpretation is as follows:  Left knee: no fracture or dislocation, total knee prosthesis in acceptable position and alignment, no evidence of loosening       Procedures, if performed today:  Procedures    None performed

## 2018-11-07 ENCOUNTER — OFFICE VISIT (OUTPATIENT)
Dept: OBGYN CLINIC | Facility: HOSPITAL | Age: 83
End: 2018-11-07
Payer: COMMERCIAL

## 2018-11-07 ENCOUNTER — HOSPITAL ENCOUNTER (OUTPATIENT)
Dept: RADIOLOGY | Facility: HOSPITAL | Age: 83
Discharge: HOME | End: 2018-11-07
Attending: ORTHOPAEDIC SURGERY
Payer: COMMERCIAL

## 2018-11-07 VITALS
DIASTOLIC BLOOD PRESSURE: 71 MMHG | BODY MASS INDEX: 26.98 KG/M2 | WEIGHT: 158 LBS | HEIGHT: 64 IN | SYSTOLIC BLOOD PRESSURE: 136 MMHG | HEART RATE: 76 BPM

## 2018-11-07 DIAGNOSIS — Z47.1 AFTERCARE FOLLOWING LEFT KNEE JOINT REPLACEMENT SURGERY: Primary | ICD-10-CM

## 2018-11-07 DIAGNOSIS — Z96.652 AFTERCARE FOLLOWING LEFT KNEE JOINT REPLACEMENT SURGERY: ICD-10-CM

## 2018-11-07 DIAGNOSIS — G89.29 CHRONIC PAIN OF LEFT KNEE: ICD-10-CM

## 2018-11-07 DIAGNOSIS — Z96.652 AFTERCARE FOLLOWING LEFT KNEE JOINT REPLACEMENT SURGERY: Primary | ICD-10-CM

## 2018-11-07 DIAGNOSIS — M25.562 CHRONIC PAIN OF LEFT KNEE: ICD-10-CM

## 2018-11-07 DIAGNOSIS — Z47.1 AFTERCARE FOLLOWING LEFT KNEE JOINT REPLACEMENT SURGERY: ICD-10-CM

## 2018-11-07 PROCEDURE — 4040F PNEUMOC VAC/ADMIN/RCVD: CPT | Performed by: ORTHOPAEDIC SURGERY

## 2018-11-07 PROCEDURE — 99213 OFFICE O/P EST LOW 20 MIN: CPT | Performed by: ORTHOPAEDIC SURGERY

## 2018-11-07 PROCEDURE — 73560 X-RAY EXAM OF KNEE 1 OR 2: CPT

## 2018-11-07 RX ORDER — METOPROLOL SUCCINATE 25 MG/1
25 TABLET, EXTENDED RELEASE ORAL DAILY
Refills: 3 | COMMUNITY
Start: 2018-09-23 | End: 2019-06-10 | Stop reason: SDUPTHER

## 2018-11-07 NOTE — PROGRESS NOTES
Assessment:  1  Aftercare following left knee joint replacement surgery  XR knee 1 or 2 vw left     Patient Active Problem List   Diagnosis    S/P total knee arthroplasty, left    Aftercare following left knee joint replacement surgery    Fatigue           Plan      Pina Huizar is doing well with her left total knee replacement  She will continue with activities as tolerated and will follow up in another 6 months for her 1 year anniversary visit  X-rays of her left knee will be obtained at that time  Patient was seen, examined and plan formulated by Dr Jairo Fernández:     Patient ID:    Chief Elian Rogel 82 y o  female      HPI    42-year-old female who is a little over 6 months status post left total knee replacement  She presents today feeling well  She has no complaints  Her activity levels are needing her expectations  The following portions of the patient's history were reviewed and updated as appropriate: allergies, current medications, past family history, past social history, past surgical history and problem list     All organ systems normal    Social History     Social History    Marital status:      Spouse name: N/A    Number of children: N/A    Years of education: N/A     Occupational History    Not on file       Social History Main Topics    Smoking status: Never Smoker    Smokeless tobacco: Never Used    Alcohol use No    Drug use: No    Sexual activity: Not on file     Other Topics Concern    Not on file     Social History Narrative    No narrative on file     Past Medical History:   Diagnosis Date    Cardiac disease     History of transfusion     Hypertension     MI, old      Past Surgical History:   Procedure Laterality Date     SECTION      JOINT REPLACEMENT      right     [place] TOTAL KNEE ARTHROPLASTY Left 2018    Procedure: ARTHROPLASTY KNEE TOTAL;  Surgeon: Carmelita Blackwood MD;  Location: [facility];  Service: Orthopedics   Radha Black TONSILLECTOMY      TOTAL KNEE ARTHROPLASTY Right      No Known Allergies  Current Outpatient Prescriptions on File Prior to Visit   Medication Sig Dispense Refill    ascorbic acid (VITAMIN C) 500 mg tablet Take 500 mg by mouth daily      lisinopril (ZESTRIL) 5 mg tablet Take 5 mg by mouth daily        [DISCONTINUED] ferrous sulfate 325 (65 Fe) mg tablet Take 325 mg by mouth 2 (two) times a day with meals      [DISCONTINUED] folic acid (FOLVITE) 1 mg tablet Take by mouth daily      [DISCONTINUED] nitrofurantoin (MACROBID) 100 mg capsule TAKE 1 CAPSULE BY MOUTH TWICE A DAY FOR 7 DAYS  0    [DISCONTINUED] rivaroxaban (XARELTO) 15 & 20 MG starter pack Take 15 mg by mouth twice daily for 21 days, then 20 mg once daily thereafter  51 each 0    [DISCONTINUED] XARELTO 15 MG tablet TAKE 1 TABLET (15 MG) BY MOUTH TWICE A DAY FOR 21 DAYS, THEN TAKE 20 MG ONCE DAILY THEREAFTER  0    [DISCONTINUED] XARELTO 20 MG tablet Take 1 tablet (20 mg total) by mouth daily with dinner  To start after 15mg tabLET complete  2     No current facility-administered medications on file prior to visit  Objective:        Left Knee Exam     Range of Motion   Extension: 0   Flexion: 120     Tests   Varus: negative  Valgus: negative    Other   Erythema: absent  Sensation: normal  Pulse: present  Swelling: none  Effusion: no effusion present                I have personally reviewed pertinent films in PACS and my interpretation is Stable left total knee prosthesis       Portions of the record may have been created with voice recognition software   Occasional wrong word or "sound a like" substitutions may have occurred due to the inherent limitations of voice recognition software   Read the chart carefully and recognize, using context, where substitutions have occurred

## 2019-05-06 RX ORDER — TRIAMCINOLONE ACETONIDE 1 MG/G
CREAM TOPICAL 2 TIMES DAILY
COMMUNITY
Start: 2018-11-13 | End: 2019-06-06 | Stop reason: CLARIF

## 2019-05-08 ENCOUNTER — OFFICE VISIT (OUTPATIENT)
Dept: OBGYN CLINIC | Facility: HOSPITAL | Age: 84
End: 2019-05-08
Payer: COMMERCIAL

## 2019-05-08 ENCOUNTER — HOSPITAL ENCOUNTER (OUTPATIENT)
Dept: RADIOLOGY | Facility: HOSPITAL | Age: 84
Discharge: HOME | End: 2019-05-08
Attending: ORTHOPAEDIC SURGERY
Payer: COMMERCIAL

## 2019-05-08 VITALS
HEIGHT: 64 IN | WEIGHT: 158 LBS | SYSTOLIC BLOOD PRESSURE: 141 MMHG | BODY MASS INDEX: 26.98 KG/M2 | DIASTOLIC BLOOD PRESSURE: 82 MMHG | HEART RATE: 77 BPM

## 2019-05-08 DIAGNOSIS — Z96.652 AFTERCARE FOLLOWING LEFT KNEE JOINT REPLACEMENT SURGERY: ICD-10-CM

## 2019-05-08 DIAGNOSIS — Z47.1 AFTERCARE FOLLOWING LEFT KNEE JOINT REPLACEMENT SURGERY: ICD-10-CM

## 2019-05-08 DIAGNOSIS — Z96.652 AFTERCARE FOLLOWING LEFT KNEE JOINT REPLACEMENT SURGERY: Primary | ICD-10-CM

## 2019-05-08 DIAGNOSIS — Z47.1 AFTERCARE FOLLOWING LEFT KNEE JOINT REPLACEMENT SURGERY: Primary | ICD-10-CM

## 2019-05-08 DIAGNOSIS — G89.29 CHRONIC PAIN OF LEFT KNEE: ICD-10-CM

## 2019-05-08 DIAGNOSIS — M25.562 CHRONIC PAIN OF LEFT KNEE: ICD-10-CM

## 2019-05-08 PROCEDURE — 99213 OFFICE O/P EST LOW 20 MIN: CPT | Performed by: ORTHOPAEDIC SURGERY

## 2019-05-08 PROCEDURE — 73560 X-RAY EXAM OF KNEE 1 OR 2: CPT

## 2019-06-06 ENCOUNTER — OFFICE VISIT (OUTPATIENT)
Dept: CARDIOLOGY CLINIC | Facility: CLINIC | Age: 84
End: 2019-06-06
Payer: COMMERCIAL

## 2019-06-06 VITALS
WEIGHT: 160.9 LBS | HEART RATE: 80 BPM | HEIGHT: 64 IN | SYSTOLIC BLOOD PRESSURE: 126 MMHG | BODY MASS INDEX: 27.47 KG/M2 | DIASTOLIC BLOOD PRESSURE: 72 MMHG

## 2019-06-06 DIAGNOSIS — R00.2 PALPITATIONS: Primary | ICD-10-CM

## 2019-06-06 PROCEDURE — 99215 OFFICE O/P EST HI 40 MIN: CPT | Performed by: NURSE PRACTITIONER

## 2019-06-06 PROCEDURE — 93000 ELECTROCARDIOGRAM COMPLETE: CPT | Performed by: NURSE PRACTITIONER

## 2019-06-10 DIAGNOSIS — R00.2 PALPITATIONS: ICD-10-CM

## 2019-06-10 DIAGNOSIS — I10 ESSENTIAL HYPERTENSION: Primary | ICD-10-CM

## 2019-06-11 RX ORDER — METOPROLOL SUCCINATE 25 MG/1
TABLET, EXTENDED RELEASE ORAL
Qty: 90 TABLET | Refills: 3 | Status: SHIPPED | OUTPATIENT
Start: 2019-06-11 | End: 2019-08-15 | Stop reason: SDUPTHER

## 2019-07-09 ENCOUNTER — TELEPHONE (OUTPATIENT)
Dept: CARDIOLOGY CLINIC | Facility: CLINIC | Age: 84
End: 2019-07-09

## 2019-07-09 NOTE — TELEPHONE ENCOUNTER
P/C states she is having more episodes of tachycardia, had 2 this am  She would like to know if she could increase her medication  Taking 1/2 of excedrin a day  Will stop excedrin all together  Did instruct on keeping caffiene levels down        Taking: Toprol-xl 25 mg qd                Lisinopril 5 mg qd      Please advise

## 2019-07-19 ENCOUNTER — HOSPITAL ENCOUNTER (EMERGENCY)
Facility: HOSPITAL | Age: 84
Discharge: HOME | End: 2019-07-19
Attending: EMERGENCY MEDICINE | Admitting: EMERGENCY MEDICINE
Payer: COMMERCIAL

## 2019-07-19 VITALS
BODY MASS INDEX: 27.44 KG/M2 | RESPIRATION RATE: 16 BRPM | DIASTOLIC BLOOD PRESSURE: 76 MMHG | SYSTOLIC BLOOD PRESSURE: 148 MMHG | OXYGEN SATURATION: 95 % | WEIGHT: 159.83 LBS | HEART RATE: 82 BPM | TEMPERATURE: 97.8 F

## 2019-07-19 DIAGNOSIS — H81.12 BENIGN PAROXYSMAL POSITIONAL VERTIGO OF LEFT EAR: Primary | ICD-10-CM

## 2019-07-19 PROCEDURE — 99282 EMERGENCY DEPT VISIT SF MDM: CPT | Performed by: EMERGENCY MEDICINE

## 2019-07-19 PROCEDURE — 99283 EMERGENCY DEPT VISIT LOW MDM: CPT

## 2019-07-20 NOTE — DISCHARGE INSTRUCTIONS
Diagnosis; BENIGN PAROXYSMAL POSITIONAL VERTIGO ( BPPV)- TO LEFT-       - ACTIVITY AS TOLERATED     -  NO FAST MOVEMENTS/ POSITION CHANGES- SLOW GRADUAL MOVEMENTS- SLEEP SITTING UPRIGHT    - IF SYMPTOMS BECOME MORE PERSISTENT-- PLEASE CALL THE PHYSICAL THERAPY -- [phone] TO SCHEDULE AN APPOINTMENT- ASK FOR THE THERAPIST  WHO HANDLES VERTIGO    - PLEASE RETURN TO  THE ER FOR ANY  INTRACTABLE SENSE OF EXTERNAL MOTION // ANY NEW PROBLEMS WITH VISION/ TALKING/SWALLOWING/ ANY PERSISTENT VOMITING OR ANY INABILITY TO WALK  OR ANY NEW/ WORSENING/CONCERNING SYMPTOMS TO YOU

## 2019-07-20 NOTE — ED NOTES
"When I stay perfectly still it's alright"  Pt states episode began this morning at 4am following getting up to go to the bathroom  Pt denies trauma to head  Pt states after coming back to bed, prior to going back to sleep, she started having a dizzy feeling  Pt states one prior episode like this one, "but it went away in hours"  Pt states spent all day in recliner, eating and drinking little       Denis Brooks, RN  07/19/19 4712

## 2019-07-23 NOTE — ED PROVIDER NOTES
History  Chief Complaint   Patient presents with    Dizziness     Patient states that she thinks her crystals in her ears have moved  Patient has HX vertigo and stones  82 yr female - told in past has vertigo - crystal type- and since this am with similar type symptoms of sense of external motion with movement only -- with nausea- feels improved when remaining still-- no head/neck pain -- no diplopia/ dysarthria/ dysphagia/ dysphonia/ dysmetria-- - no new meds/ recent illness--       History provided by:  Patient and spouse   used: No    Dizziness   Associated symptoms: nausea    Associated symptoms: no blood in stool, no diarrhea, no headaches, no vomiting and no weakness        Prior to Admission Medications   Prescriptions Last Dose Informant Patient Reported? Taking? Acetaminophen-Caffeine (EXCEDRIN TENSION HEADACHE) 500-65 MG TABS Not Taking at Unknown time Self Yes No   lisinopril (ZESTRIL) 5 mg tablet 2019 at Unknown time Self Yes Yes   Sig: Take 5 mg by mouth daily     metoprolol succinate (TOPROL-XL) 25 mg 24 hr tablet 2019 at Unknown time  No Yes   Sig: TAKE ONE TABLET BY MOUTH ONCE DAILY      Facility-Administered Medications: None       Past Medical History:   Diagnosis Date    Cardiac disease     History of transfusion     Hypertension     MI, old     Palpitations     SOB (shortness of breath)        Past Surgical History:   Procedure Laterality Date     SECTION      JOINT REPLACEMENT      right     [place] TOTAL KNEE ARTHROPLASTY Left 2018    Procedure: ARTHROPLASTY KNEE TOTAL;  Surgeon: Lilliam Gray MD;  [facility];  Service: Orthopedics    TONSILLECTOMY      TOTAL KNEE ARTHROPLASTY Right        Family History   Problem Relation Age of Onset    Cancer Sister      I have reviewed and agree with the history as documented      Social History     Tobacco Use    Smoking status: Never Smoker    Smokeless tobacco: Never Used   Substance Use Topics    Alcohol use: No    Drug use: No        Review of Systems   Constitutional: Negative  HENT: Negative  Eyes: Negative  Respiratory: Negative  Cardiovascular: Negative  Gastrointestinal: Positive for nausea  Negative for abdominal distention, abdominal pain, anal bleeding, blood in stool, constipation, diarrhea, rectal pain and vomiting  Endocrine: Negative  Genitourinary: Negative  Musculoskeletal: Negative  Skin: Negative  Allergic/Immunologic: Negative  Neurological: Positive for dizziness  Negative for tremors, seizures, syncope, facial asymmetry, speech difficulty, weakness, light-headedness, numbness and headaches  Hematological: Negative  Psychiatric/Behavioral: Negative  Physical Exam  Physical Exam   Constitutional: She is oriented to person, place, and time  No distress  avss-- htn-- pulse ox 96 % on ra- interpretation is normal- no intervention    HENT:   Head: Normocephalic and atraumatic  Right Ear: External ear normal    Left Ear: External ear normal    Nose: Nose normal    Mouth/Throat: Oropharynx is clear and moist  No oropharyngeal exudate  Eyes: Pupils are equal, round, and reactive to light  Conjunctivae and EOM are normal  Right eye exhibits no discharge  Left eye exhibits no discharge  No scleral icterus  Mm pink   Neck: Normal range of motion  Neck supple  No JVD present  No tracheal deviation present  No thyromegaly present  No pmt c/ spine   Cardiovascular: Normal rate, regular rhythm, normal heart sounds and intact distal pulses  Exam reveals no gallop and no friction rub  No murmur heard  Pulmonary/Chest: Effort normal and breath sounds normal  No stridor  No respiratory distress  She has no wheezes  She has no rales  She exhibits no tenderness  Abdominal: Soft  Bowel sounds are normal  She exhibits no distension and no mass  There is no tenderness  There is no rebound and no guarding  No hernia     Musculoskeletal: Normal range of motion  She exhibits no edema, tenderness or deformity  Normal/equal bilateral radial/ dp pulses- no ble edema/calf tenderness/asym/ erythema   Lymphadenopathy:     She has no cervical adenopathy  Neurological: She is alert and oriented to person, place, and time  No cranial nerve deficit or sensory deficit  She exhibits normal muscle tone  Coordination normal    No nystagmus- neg test of sckew/ normal finger to nose bilaterally    Skin: Skin is warm  Capillary refill takes less than 2 seconds  No rash noted  She is not diaphoretic  No erythema  No pallor  Psychiatric: She has a normal mood and affect  Her behavior is normal  Judgment and thought content normal    Nursing note and vitals reviewed        Vital Signs  ED Triage Vitals [07/19/19 2035]   Temperature Pulse Respirations Blood Pressure SpO2   98 °F (36 7 °C) 97 16 (!) 179/92 96 %      Temp Source Heart Rate Source Patient Position - Orthostatic VS BP Location FiO2 (%)   Oral Monitor Lying Right arm --      Pain Score       No Pain           Vitals:    07/19/19 2035 07/19/19 2204   BP: (!) 179/92 148/76   Pulse: 97 82   Patient Position - Orthostatic VS: Lying          Visual Acuity  Visual Acuity      Most Recent Value   L Pupil Size (mm)  3   R Pupil Size (mm)  3          ED Medications  Medications - No data to display    Diagnostic Studies  Results Reviewed     None                 No orders to display              Procedures  Procedures       ED Course  ED Course as of Jul 23 1011 Fri Jul 19, 2019 2224 -- er md procedure note-  for [employer] maneuver -- with symptoms on left- with brief rotary nystagmus-- then on right side for 2 minutes- no sign symptoms- then into epley maneuver  with 2 minutes on r shoulder-- - pt tolerated procedure well        2227 Er md note- pt re-evaluated  after eply -- pt reports improvement- pt able to ambulate with no symptoms prior to er d/c      2228 Er md medical decision making note- 2228  Based on h and p - er md clinical suspicion of a central cause of vertigo- is low- will d/c                                  MDM    Disposition  Final diagnoses:   Benign paroxysmal positional vertigo of left ear     Time reflects when diagnosis was documented in both MDM as applicable and the Disposition within this note     Time User Action Codes Description Comment    7/19/2019 10:29 PM Zac Leahy Add [L49 [id number]] Benign paroxysmal positional vertigo of left ear       ED Disposition     ED Disposition Condition Date/Time Comment    Discharge Stable Fri Jul 19, 2019 10:29 PM Ching Fernández discharge to home/self care  Follow-up Information    None         Discharge Medication List as of 7/19/2019 10:33 PM      CONTINUE these medications which have NOT CHANGED    Details   lisinopril (ZESTRIL) 5 mg tablet Take 5 mg by mouth daily  , Historical Med      metoprolol succinate (TOPROL-XL) 25 mg 24 hr tablet TAKE ONE TABLET BY MOUTH ONCE DAILY, Normal      Acetaminophen-Caffeine (EXCEDRIN TENSION HEADACHE) 500-65 MG TABS Historical Med           No discharge procedures on file      ED Provider  Electronically Signed by           Sondra Irby MD  07/23/19 1018       Sondra Irby MD  07/23/19 1019

## 2019-08-15 ENCOUNTER — OFFICE VISIT (OUTPATIENT)
Dept: CARDIOLOGY CLINIC | Facility: CLINIC | Age: 84
End: 2019-08-15
Payer: COMMERCIAL

## 2019-08-15 VITALS
SYSTOLIC BLOOD PRESSURE: 124 MMHG | HEIGHT: 64 IN | WEIGHT: 158 LBS | OXYGEN SATURATION: 97 % | DIASTOLIC BLOOD PRESSURE: 78 MMHG | BODY MASS INDEX: 26.98 KG/M2 | HEART RATE: 90 BPM

## 2019-08-15 DIAGNOSIS — I49.1 PAC (PREMATURE ATRIAL CONTRACTION): Primary | ICD-10-CM

## 2019-08-15 DIAGNOSIS — I47.1 PSVT (PAROXYSMAL SUPRAVENTRICULAR TACHYCARDIA) (HCC): ICD-10-CM

## 2019-08-15 DIAGNOSIS — R00.2 PALPITATIONS: ICD-10-CM

## 2019-08-15 PROCEDURE — 99214 OFFICE O/P EST MOD 30 MIN: CPT | Performed by: INTERNAL MEDICINE

## 2019-08-15 RX ORDER — MAG HYDROX/ALUMINUM HYD/SIMETH 400-400-40
SUSPENSION, ORAL (FINAL DOSE FORM) ORAL DAILY
COMMUNITY
End: 2019-11-22

## 2019-08-15 RX ORDER — METOPROLOL SUCCINATE 25 MG/1
25 TABLET, EXTENDED RELEASE ORAL 2 TIMES DAILY
Qty: 180 TABLET | Refills: 3 | Status: SHIPPED | OUTPATIENT
Start: 2019-08-15 | End: 2020-06-01 | Stop reason: ALTCHOICE

## 2019-08-15 NOTE — PROGRESS NOTES
Cardiology Follow Up    Gio Hernandez  1935  [id number]  [address]  [facility]  [address]  [phone]  [phone]    1  PAC (premature atrial contraction)  Holter monitor - 48 hour   2  Palpitations  metoprolol succinate (TOPROL-XL) 25 mg 24 hr tablet   3  PSVT (paroxysmal supraventricular tachycardia) ([facility])  Holter monitor - 48 hour       Discussion/Summary:    72-year-old female  History of paroxysmal SVT  Palpitations  Will trial her on a higher dose of metoprolol  Check 48 hour Holter monitor  Her symptoms have improved somewhat since decreasing caffeine, but she still notices them particularly when she is on her left-sided and it makes her very nervous and uncomfortable  History of Present Illness:     72-year-old female  I had seen her previously for symptoms of palpitations and paroxysmal SVT  Unremarkable echocardiogram in the past   Holter monitor with no significant arrhythmias, but she did have a stress test which showed exercised induced SVT  Have been stable, but recently saw our nurse practitioner after she started feeling more palpitations  Seem to correlate with taking Excedrin which has caffeine  Feel symptoms much worse when she is on the left side  No presyncope or syncope      Problem List     S/P total knee arthroplasty, left    Aftercare following left knee joint replacement surgery    Fatigue    Chronic pain of left knee        Past Medical History:   Diagnosis Date    Cardiac disease     History of transfusion     Hypertension     MI, old     Palpitations     SOB (shortness of breath)      Social History     Tobacco Use    Smoking status: Never Smoker    Smokeless tobacco: Never Used   Substance Use Topics    Alcohol use: No    Drug use: No      Family History   Problem Relation Age of Onset    Cancer Sister      Past Surgical History:   Procedure Laterality Date     SECTION      JOINT REPLACEMENT      right     [place] TOTAL KNEE ARTHROPLASTY Left 2018    Procedure: ARTHROPLASTY KNEE TOTAL;  Surgeon: Leonel Sanchez MD;  [facility];  Service: Orthopedics    TONSILLECTOMY      TOTAL KNEE ARTHROPLASTY Right        Current Outpatient Medications:     Acetaminophen-Caffeine (EXCEDRIN TENSION HEADACHE) 500-65 MG TABS, , Disp: , Rfl:     Cholecalciferol (VITAMIN D3) 5000 units CAPS, Take by mouth daily, Disp: , Rfl:     lisinopril (ZESTRIL) 5 mg tablet, Take 5 mg by mouth daily  , Disp: , Rfl:     metoprolol succinate (TOPROL-XL) 25 mg 24 hr tablet, Take 1 tablet (25 mg total) by mouth 2 (two) times a day, Disp: 180 tablet, Rfl: 3  No Known Allergies    Vitals:    08/15/19 1328   BP: 124/78   BP Location: Right arm   Patient Position: Sitting   Cuff Size: Standard   Pulse: 90   SpO2: 97%   Weight: 71 7 kg (158 lb)   Height: 5' 4" (1 626 m)     Vitals:    08/15/19 1328   Weight: 71 7 kg (158 lb)      Height: 5' 4" (162 6 cm)   Body mass index is 27 12 kg/m²  Physical Exam:  GENERAL: Alert, well appearing, and in no distress  HEENT:  PERRL, EOMI, no scleral icterus, no conjunctival pallor  NECK:  Supple, No elevated JVP, no thyromegaly, no carotid bruits  HEART:  Regular rate and rhythm, normal S1/S2, no S3/S4, no murmur or rub  LUNGS:  Clear to auscultation bilaterally  ABDOMEN:  Soft, non-tender, positive bowel sounds, no rebound or guarding  EXTREMITIES:  No edema  VASCULAR:  Normal pedal pulses   NEURO: No focal deficits,  SKIN: Normal without suspicious lesions on exposed skin      ROS:  Except as noted in HPI, is otherwise reviewed in detail and a 12 point review of systems is negative      Labs:  Lab Results   Component Value Date     2015    K 4 3 2018     2018    CREATININE 0 96 2018    BUN 15 2018    CO2 27 2018    ALT 30 2015    AST 23 2015    INR 0 99 2018    GLUF 82 04/11/2018    HGBA1C 5 4 04/11/2018    WBC 11 52 (H) 05/01/2018    HGB 11 8 05/15/2018    HCT 36 1 05/15/2018     05/01/2018       No results found for: CHOL  No results found for: LDLCALC  No results found for: HDL  No results found for: TRIG

## 2019-08-29 ENCOUNTER — HOSPITAL ENCOUNTER (OUTPATIENT)
Dept: NON INVASIVE DIAGNOSTICS | Facility: CLINIC | Age: 84
Discharge: HOME | End: 2019-08-29
Payer: COMMERCIAL

## 2019-08-29 DIAGNOSIS — I47.1 PSVT (PAROXYSMAL SUPRAVENTRICULAR TACHYCARDIA) (HCC): ICD-10-CM

## 2019-08-29 DIAGNOSIS — I49.1 PAC (PREMATURE ATRIAL CONTRACTION): ICD-10-CM

## 2019-08-29 PROCEDURE — 93225 XTRNL ECG REC<48 HRS REC: CPT

## 2019-08-29 PROCEDURE — 93226 XTRNL ECG REC<48 HR SCAN A/R: CPT

## 2019-09-04 ENCOUNTER — TELEPHONE (OUTPATIENT)
Dept: CARDIOLOGY CLINIC | Facility: CLINIC | Age: 84
End: 2019-09-04

## 2019-09-04 PROCEDURE — 93227 XTRNL ECG REC<48 HR R&I: CPT | Performed by: INTERNAL MEDICINE

## 2019-09-04 NOTE — TELEPHONE ENCOUNTER
----- Message from Nino Lennon MD sent at 9/4/2019  3:43 PM EDT -----  Please let patient know Holter monitor was normal

## 2019-11-22 ENCOUNTER — OFFICE VISIT (OUTPATIENT)
Dept: CARDIOLOGY CLINIC | Facility: CLINIC | Age: 84
End: 2019-11-22
Payer: COMMERCIAL

## 2019-11-22 VITALS
HEIGHT: 64 IN | SYSTOLIC BLOOD PRESSURE: 126 MMHG | DIASTOLIC BLOOD PRESSURE: 76 MMHG | HEART RATE: 76 BPM | WEIGHT: 157.9 LBS | OXYGEN SATURATION: 98 % | BODY MASS INDEX: 26.96 KG/M2

## 2019-11-22 DIAGNOSIS — I49.1 PAC (PREMATURE ATRIAL CONTRACTION): ICD-10-CM

## 2019-11-22 DIAGNOSIS — I47.1 PSVT (PAROXYSMAL SUPRAVENTRICULAR TACHYCARDIA) (HCC): ICD-10-CM

## 2019-11-22 DIAGNOSIS — R00.2 PALPITATIONS: Primary | ICD-10-CM

## 2019-11-22 PROCEDURE — 99214 OFFICE O/P EST MOD 30 MIN: CPT | Performed by: INTERNAL MEDICINE

## 2019-11-22 NOTE — PROGRESS NOTES
Cardiology Follow Up    Ekaterina Menezes  1935  [id number]  [address]  [facility] [address]  [phone]    1  Palpitations     2  PAC (premature atrial contraction)     3  PSVT (paroxysmal supraventricular tachycardia) ([facility])         Discussion/Summary:      History of paroxysmal SVT, PACs  Exercised induced SVT on a stress test     Symptoms are improved with the increased dose of the beta-blocker  Perfusion imaging on her stress test was normal    Currently doing very well  BP controlled  Continue current medications and follow up in 1 year  History of Present Illness:     82-year-old female  I had seen her previously for symptoms of palpitations and paroxysmal SVT  Unremarkable echocardiogram in the past   Holter monitor with no significant arrhythmias, but she did have a stress test which showed exercised induced SVT  I saw her few months back, and she was feeling symptoms of palpitations again  We repeated a Holter monitor increased her beta-blocker  Symptoms have improved significantly  She has essentially no symptoms at this point at all  Her Holter monitor showed no significant arrhythmias  Denies any dizziness or lightheadedness  Continues to get occasional headaches, but this tends to come with weather changes and is typical for her      Problem List     S/P total knee arthroplasty, left    Aftercare following left knee joint replacement surgery    Fatigue    Chronic pain of left knee        Past Medical History:   Diagnosis Date    Cardiac disease     History of transfusion     Hypertension     MI, old     Palpitations     SOB (shortness of breath)      Social History     Tobacco Use    Smoking status: Never Smoker    Smokeless tobacco: Never Used   Substance Use Topics    Alcohol use: No    Drug use: No      Family History   Problem Relation Age of Onset  Cancer Sister      Past Surgical History:   Procedure Laterality Date     SECTION      JOINT REPLACEMENT      right     [place] TOTAL KNEE ARTHROPLASTY Left 2018    Procedure: ARTHROPLASTY KNEE TOTAL;  Surgeon: Ryan Sanders MD;  [facility];  Service: Orthopedics    TONSILLECTOMY      TOTAL KNEE ARTHROPLASTY Right        Current Outpatient Medications:     Acetaminophen-Caffeine (EXCEDRIN TENSION HEADACHE) 500-65 MG TABS, , Disp: , Rfl:     lisinopril (ZESTRIL) 5 mg tablet, Take 5 mg by mouth daily  , Disp: , Rfl:     metoprolol succinate (TOPROL-XL) 25 mg 24 hr tablet, Take 1 tablet (25 mg total) by mouth 2 (two) times a day, Disp: 180 tablet, Rfl: 3  No Known Allergies    Vitals:    19 0937   BP: 126/76   BP Location: Left arm   Patient Position: Sitting   Cuff Size: Standard   Pulse: 76   SpO2: 98%   Weight: 71 6 kg (157 lb 14 4 oz)   Height: 5' 4" (1 626 m)     Vitals:    19 0937   Weight: 71 6 kg (157 lb 14 4 oz)      Height: 5' 4" (162 6 cm)   Body mass index is 27 1 kg/m²  Physical Exam:  GEN: Boo Ibanez appears well, alert and oriented x 3, pleasant and cooperative   HEENT: pupils equal, round, and reactive to light; extraocular muscles intact  NECK: supple, no carotid bruits   HEART: regular rhythm, normal S1 and S2, no murmurs, clicks, gallops or rubs   LUNGS: clear to auscultation bilaterally; no wheezes, rales, or rhonchi   ABDOMEN: normal bowel sounds, soft, no tenderness, no distention  EXTREMITIES: peripheral pulses normal; no clubbing, cyanosis, or edema  NEURO: no focal findings   SKIN: normal without suspicious lesions on exposed skin      ROS:  Except as noted in HPI, is otherwise reviewed in detail and a 12 point review of systems is negative    ROS reviewed and is unchanged    Labs:  Lab Results   Component Value Date     2015    K 4 3 2018     2018    CREATININE 0 96 2018    BUN 15 2018    CO2 27 05/02/2018    ALT 30 08/09/2015    AST 23 08/09/2015    INR 0 99 04/11/2018    GLUF 82 04/11/2018    HGBA1C 5 4 04/11/2018    WBC 11 52 (H) 05/01/2018    HGB 11 8 05/15/2018    HCT 36 1 05/15/2018     05/01/2018     Holter 8/29/19:  INDICATIONS:   48 hour Holter monitor was performed 08/29/2019 for SVT  Average heart rate was 71 beats per minute  Minimum heart rate was 51 beats per minute at 5:00 a m     Maximum heart rate 110 beats per minute at 4:05 p m        Ventricular ectopic activity consisted of 584 beats, comprising 0 3% of total beats  All of these were isolated PVCs      Supraventricular ectopic activity consisted of 202 beats, comprising 0 1% of total beats  Six were in atrial couplets, 2 were late beats, 194 were isolated PACs      Longest R-R interval was 1 6 seconds at 5:10 a m        Patient's rhythm included 5 hours 56 minutes of bradycardia  Most of the bradycardia occurred between the hours of 11:00 p m  to 7:00 a m  on both days      Patient's rhythm included 10 minutes 47 seconds of tachycardia      No symptoms were noted      IMPRESSIONS:  1  Sinus rhythm with occasional ventricular and rare supraventricular ectopic activity  No episodes of SVT were seen  2  No symptoms were noted

## 2020-02-14 ENCOUNTER — HOSPITAL ENCOUNTER (EMERGENCY)
Facility: HOSPITAL | Age: 85
Discharge: HOME | End: 2020-02-14
Attending: EMERGENCY MEDICINE
Payer: COMMERCIAL

## 2020-02-14 VITALS
OXYGEN SATURATION: 98 % | SYSTOLIC BLOOD PRESSURE: 157 MMHG | BODY MASS INDEX: 26.46 KG/M2 | DIASTOLIC BLOOD PRESSURE: 67 MMHG | HEART RATE: 77 BPM | HEIGHT: 64 IN | WEIGHT: 155 LBS | TEMPERATURE: 97.9 F | RESPIRATION RATE: 16 BRPM

## 2020-02-14 DIAGNOSIS — M25.561 ACUTE PAIN OF RIGHT KNEE: Primary | ICD-10-CM

## 2020-02-14 PROCEDURE — 99283 EMERGENCY DEPT VISIT LOW MDM: CPT | Performed by: EMERGENCY MEDICINE

## 2020-02-14 PROCEDURE — 99283 EMERGENCY DEPT VISIT LOW MDM: CPT

## 2020-02-14 NOTE — DISCHARGE INSTRUCTIONS
DIAGNOSIS; RIGHT POSTERIOR KNEE PAIN     - FOR ANY PAIN- OVER THE COUNTER TYLENOL 500 MG EVERY 4 HRS    - PLEASE RETURN TO  THE ER FOR ANY RIGHT LOWER LEG Kumar [place]- ANY CHEST PAIN/ SHORTNESS OF BREATH/ PASSING OUT OR ANY NEW/ WORSENING/CONCERNING SYMPTOMS TO YOU

## 2020-02-18 NOTE — ED PROVIDER NOTES
History  Chief Complaint   Patient presents with    Leg Pain     pt complains of R knee pain, was seen at Dr  was sent for doppler was negative for DVT      82 yr female c/o r knee pain over last week- no injury - no previous hx of vte-   No cp/sob/palp/near syncope-  has hx of dvt- pt became concerned went to see pmd- as per pt had neg rle I/s today for dvt- pmd sent blood work which was elevated- and was told to come to the er for eval- pt current has no comps      History provided by:  Patient and spouse   used: No    Leg Pain   Location:  Knee  Time since incident:  1 day  Pain details:     Quality:  Aching      Prior to Admission Medications   Prescriptions Last Dose Informant Patient Reported? Taking? Acetaminophen-Caffeine (EXCEDRIN TENSION HEADACHE) 500-65 MG TABS  Self Yes No   lisinopril (ZESTRIL) 5 mg tablet  Self Yes No   Sig: Take 5 mg by mouth daily     metoprolol succinate (TOPROL-XL) 25 mg 24 hr tablet  Self No No   Sig: Take 1 tablet (25 mg total) by mouth 2 (two) times a day      Facility-Administered Medications: None       Past Medical History:   Diagnosis Date    Cardiac disease     History of transfusion     Hypertension     MI, old     Palpitations     SOB (shortness of breath)        Past Surgical History:   Procedure Laterality Date     SECTION      JOINT REPLACEMENT      right     [place] TOTAL KNEE ARTHROPLASTY Left 2018    Procedure: ARTHROPLASTY KNEE TOTAL;  Surgeon: Yesenia Silva MD;  [facility];  Service: Orthopedics    TONSILLECTOMY      TOTAL KNEE ARTHROPLASTY Right        Family History   Problem Relation Age of Onset    Cancer Sister      I have reviewed and agree with the history as documented  Social History     Tobacco Use    Smoking status: Never Smoker    Smokeless tobacco: Never Used   Substance Use Topics    Alcohol use: No    Drug use: No       Review of Systems   Constitutional: Negative      HENT: Negative  Eyes: Negative  Respiratory: Negative  Cardiovascular: Negative  Gastrointestinal: Negative  Endocrine: Negative  Genitourinary: Negative  Musculoskeletal: Negative  R knee pain    Skin: Negative  Allergic/Immunologic: Negative  Neurological: Negative  Hematological: Negative  Psychiatric/Behavioral: Negative  Physical Exam  Physical Exam   Constitutional: She is oriented to person, place, and time  She appears well-developed and well-nourished  No distress  avss-- pulse ox 98 % on ra- interpretation is normal- no intervention-  Very well appearing- in nad   HENT:   Head: Normocephalic and atraumatic  Eyes: Pupils are equal, round, and reactive to light  Conjunctivae and EOM are normal  Right eye exhibits no discharge  Left eye exhibits no discharge  No scleral icterus  Mm pink   Neck: Normal range of motion  Neck supple  No JVD present  No tracheal deviation present  No thyromegaly present  No pmt c/t/l/s spine   Cardiovascular: Normal rate, regular rhythm, normal heart sounds and intact distal pulses  Exam reveals no gallop and no friction rub  No murmur heard  Pulmonary/Chest: Effort normal and breath sounds normal  No stridor  No respiratory distress  She has no wheezes  She has no rales  She exhibits no tenderness  Abdominal: Soft  Bowel sounds are normal  She exhibits no distension and no mass  There is no tenderness  There is no rebound and no guarding  No hernia  Soft nt/nd- no cva tenderness- no  tender abd/ mass/bruit/   Musculoskeletal: Normal range of motion  She exhibits no edema, tenderness or deformity  r knee- no effusion/ overlying erythema-- normal flex/ecxt- nt- no lig laxity -     - equal bilateral radial/dp pulses- no ble edema/calf tendernss/asym/ erythema   Lymphadenopathy:     She has no cervical adenopathy  Neurological: She is alert and oriented to person, place, and time   No cranial nerve deficit or sensory deficit  She exhibits normal muscle tone  Coordination normal    Skin: Skin is warm  Capillary refill takes less than 2 seconds  No rash noted  She is not diaphoretic  No erythema  No pallor  Psychiatric: She has a normal mood and affect  Her behavior is normal  Judgment and thought content normal    Nursing note and vitals reviewed  Vital Signs  ED Triage Vitals [02/14/20 1756]   Temperature Pulse Respirations Blood Pressure SpO2   97 9 °F (36 6 °C) 77 16 157/67 98 %      Temp Source Heart Rate Source Patient Position - Orthostatic VS BP Location FiO2 (%)   Oral -- -- -- --      Pain Score       4           Vitals:    02/14/20 1756   BP: 157/67   Pulse: 77         Visual Acuity      ED Medications  Medications - No data to display    Diagnostic Studies  Results Reviewed     None                 No orders to display              Procedures  Procedures         ED Course  ED Course as of Feb 18 1603   Fri Feb 14, 2020   1817 - ER MD NOTE- AS PER PT- NEG RLE DOPPLER U/S FOR DVT TODAY - PT HAD D DIMER SENT BY PMD 1100- PT HAS NO CARDIAC PULM COMPLAINTS-- PT WAS ONLY CONCERNED ABOUT DVT BECAUSE  HAD ONE- PT HAS PAIN BEHIND R KNEE AFTER INCREASED ACTIVITY - 3 WEEKS AGO- NO BLE EDEMA/ERYTHEMA- ER MD CLINICAL SUSPICION OF VTE/PE IS VERY LOW- PT AGREES WITH THIS AND AGREES TO NO TESTING AT THIS POINT                                  MDM      Disposition  Final diagnoses:   Acute pain of right knee     Time reflects when diagnosis was documented in both MDM as applicable and the Disposition within this note     Time User Action Codes Description Comment    2/15/2020 12:54 AM Kartik Silva Add [M20 044] Acute pain of right knee       ED Disposition     ED Disposition Condition Date/Time Comment    Discharge Stable Fri Feb 14, 2020  6:20 PM Scotty Zambrano discharge to home/self care              Follow-up Information    None         Discharge Medication List as of 2/14/2020  6:21 PM      CONTINUE these medications which have NOT CHANGED    Details   Acetaminophen-Caffeine (EXCEDRIN TENSION HEADACHE) 500-65 MG TABS Historical Med      lisinopril (ZESTRIL) 5 mg tablet Take 5 mg by mouth daily  , Historical Med      metoprolol succinate (TOPROL-XL) 25 mg 24 hr tablet Take 1 tablet (25 mg total) by mouth 2 (two) times a day, Starting Thu 8/15/2019, Normal           No discharge procedures on file      PDMP Review     None          ED Provider  Electronically Signed by           Natividad Gardner MD  20 [id number]

## 2020-06-01 ENCOUNTER — TELEPHONE (OUTPATIENT)
Dept: CARDIOLOGY CLINIC | Facility: CLINIC | Age: 85
End: 2020-06-01

## 2020-06-01 RX ORDER — METOPROLOL SUCCINATE 25 MG/1
25 TABLET, EXTENDED RELEASE ORAL 2 TIMES DAILY
COMMUNITY
End: 2020-06-04 | Stop reason: SDUPTHER

## 2020-06-04 DIAGNOSIS — R00.2 PALPITATIONS: Primary | ICD-10-CM

## 2020-06-04 RX ORDER — METOPROLOL SUCCINATE 25 MG/1
TABLET, EXTENDED RELEASE ORAL
Qty: 360 TABLET | Refills: 3 | Status: SHIPPED | OUTPATIENT
Start: 2020-06-04 | End: 2021-08-19

## 2020-06-24 ENCOUNTER — TELEPHONE (OUTPATIENT)
Dept: SURGICAL ONCOLOGY | Facility: CLINIC | Age: 85
End: 2020-06-24

## 2020-07-09 PROBLEM — E04.1 THYROID NODULE: Status: ACTIVE | Noted: 2020-07-09

## 2020-07-10 ENCOUNTER — CONSULT (OUTPATIENT)
Dept: SURGICAL ONCOLOGY | Facility: CLINIC | Age: 85
End: 2020-07-10
Payer: COMMERCIAL

## 2020-07-10 VITALS
WEIGHT: 160 LBS | SYSTOLIC BLOOD PRESSURE: 132 MMHG | TEMPERATURE: 98.7 F | DIASTOLIC BLOOD PRESSURE: 84 MMHG | HEIGHT: 64 IN | BODY MASS INDEX: 27.31 KG/M2 | HEART RATE: 97 BPM

## 2020-07-10 DIAGNOSIS — E04.1 THYROID NODULE: Primary | ICD-10-CM

## 2020-07-10 PROCEDURE — 99203 OFFICE O/P NEW LOW 30 MIN: CPT | Performed by: SURGERY

## 2020-07-10 NOTE — PROGRESS NOTES
Surgical Oncology Consult       [address]  [facility]  [address]  1935  [id number]  [address] [place] [address],[address] [place] [address]  [facility] [place] [address] [address] [id number]    Diagnoses and all orders for this visit:    Thyroid nodule  -     US guided thyroid biopsy with AFIRMA; Future        Chief Complaint   Patient presents with    Consult     Pt here for consult for thyroid nodule found incidentally on CT several months ago  She denies any difficuly breathing or swallowing; denies hx radiation to head/neck; denies family hx of thyroid cancer  No follow-ups on file  No history exists  History of Present Illness:  82-year-old female who recently had a chest CT to rule out a blood clot  This was negative, but an incidental thyroid nodule was seen  Thyroid ultrasound revealed 2 subcentimeter nodules on the right  There was a subcentimeter nodule on the left as well as a 2 9 x 1 5 x 2 9 cm nodule on the left  This was felt to be of low risk  This did meet the criteria for biopsy  She comes in now to discuss further therapy  She denies any dysphagia or hoarseness  No history of radiation to the head or neck  No family history of thyroid cancer  Review of Systems  Complete ROS Surg Onc:   Constitutional: The patient denies new or recent history of general fatigue, no recent weight loss, no change in appetite  Eyes: No complaints of visual problems, no scleral icterus  ENT: no complaints of ear pain, no hoarseness, no difficulty swallowing,  no tinnitus and no new masses in head, oral cavity, or neck  Cardiovascular: No complaints of chest pain, no palpitations, no ankle edema  Respiratory: No complaints of shortness of breath, no cough  Gastrointestinal: No complaints of jaundice, no bloody stools, no pale stools     Genitourinary: No complaints of dysuria, no hematuria, no nocturia, no frequent urination, no urethral discharge  Musculoskeletal: No complaints of weakness, paralysis, joint stiffness or arthralgias  Integumentary: No complaints of rash, no new lesions  Neurological: No complaints of convulsions, no seizures, no dizziness  Hematologic/Lymphatic: No complaints of easy bruising  Endocrine:  No hot or cold intolerance  No polydipsia, polyphagia, or polyuria  Allergy/immunology:  No environmental allergies  No food allergies  Not immunocompromised  Skin:  No pallor or rash  No wound  Patient Active Problem List   Diagnosis    S/P total knee arthroplasty, left    Aftercare following left knee joint replacement surgery    Fatigue    Chronic pain of left knee    Thyroid nodule     Past Medical History:   Diagnosis Date    Cardiac disease     History of transfusion     Hypertension     MI, old     Palpitations     SOB (shortness of breath)      Past Surgical History:   Procedure Laterality Date     SECTION      JOINT REPLACEMENT      right     [place] TOTAL KNEE ARTHROPLASTY Left 2018    Procedure: ARTHROPLASTY KNEE TOTAL;  Surgeon: Mary Bryant MD;  [facility];  Service: Orthopedics    TONSILLECTOMY      TOTAL KNEE ARTHROPLASTY Right      Family History   Problem Relation Age of Onset    Breast cancer Sister 72    No Known Problems Mother     No Known Problems Father     Hypothyroidism Daughter      Social History     Socioeconomic History    Marital status:       Spouse name: Not on file    Number of children: Not on file    Years of education: Not on file    Highest education level: Not on file   Occupational History    Not on file   Social Needs    Financial resource strain: Not on file    Food insecurity:     Worry: Not on file     Inability: Not on file    Transportation needs:     Medical: Not on file     Non-medical: Not on file   Tobacco Use    Smoking status: Never Smoker    Smokeless tobacco: Never Used   Substance and Sexual Activity    Alcohol use: No    Drug use: No    Sexual activity: Not on file   Lifestyle    Physical activity:     Days per week: Not on file     Minutes per session: Not on file    Stress: Not on file   Relationships    Social connections:     Talks on phone: Not on file     Gets together: Not on file     Attends Yarsani service: Not on file     Active member of club or organization: Not on file     Attends meetings of clubs or organizations: Not on file     Relationship status: Not on file    Intimate partner violence:     Fear of current or ex partner: Not on file     Emotionally abused: Not on file     Physically abused: Not on file     Forced sexual activity: Not on file   Other Topics Concern    Not on file   Social History Narrative    Not on file       Current Outpatient Medications:     Acetaminophen-Caffeine (EXCEDRIN TENSION HEADACHE) 500-65 MG TABS, , Disp: , Rfl:     lisinopril (ZESTRIL) 5 mg tablet, Take 5 mg by mouth daily  , Disp: , Rfl:     metoprolol succinate (TOPROL-XL) 25 mg 24 hr tablet, Take 1 tab in the am and 2 tabs in the pm, Disp: 360 tablet, Rfl: 3  No Known Allergies  Vitals:    07/10/20 1019   BP: 132/84   Pulse: 97   Temp: 98 7 °F (37 1 °C)       Physical Exam   Constitutional: General appearance: The Patient is well-developed and well-nourished who appears the stated age in no acute distress  Patient is pleasant and talkative  HEENT:  Normocephalic  Sclerae are anicteric  Mucous membranes are moist  Neck is supple without adenopathy  No JVD  Left thyroid nodule  Chest: The lungs are clear to auscultation  Cardiac: Heart is regular rate  Abdomen: Abdomen is soft, non-tender, non-distended and without masses  Extremities: There is no clubbing or cyanosis  There is no edema  Symmetric    Neuro: Grossly nonfocal  Gait is normal      Lymphatic: No evidence of cervical adenopathy bilaterally  No evidence of axillary adenopathy bilaterally  No evidence of inguinal adenopathy bilaterally  Skin: Warm, anicteric  Psych:  Patient is pleasant and talkative  Breasts:      Pathology:  [unfilled]    Labs:      Imaging  Ultrasound is as above  I personally reviewed the films  I reviewed the above laboratory and imaging data  Discussion/Summary:  82-year-old female with subcentimeter bilateral thyroid nodules and a 2 9 cm left thyroid nodule  I have recommended she undergo biopsy of the left thyroid nodule with Afirma  If this is benign, there is a 4% false negative rate and she can be observed  If this is atypical or suspicious for follicular neoplasm or a follicular neoplasm, the Afirma testing well delineate treatment  If this is suspicious for papillary carcinoma or frankly malignant we will discuss thyroidectomy  I will see her back once we have the results of the biopsy  Further recommendations will be based on those results  She is agreeable to this  All her questions were answered

## 2020-08-03 ENCOUNTER — HOSPITAL ENCOUNTER (EMERGENCY)
Facility: HOSPITAL | Age: 85
Discharge: HOME | End: 2020-08-03
Attending: EMERGENCY MEDICINE | Admitting: EMERGENCY MEDICINE
Payer: COMMERCIAL

## 2020-08-03 VITALS
DIASTOLIC BLOOD PRESSURE: 79 MMHG | OXYGEN SATURATION: 96 % | WEIGHT: 153 LBS | RESPIRATION RATE: 16 BRPM | BODY MASS INDEX: 26.12 KG/M2 | TEMPERATURE: 97.9 F | HEIGHT: 64 IN | HEART RATE: 67 BPM | SYSTOLIC BLOOD PRESSURE: 158 MMHG

## 2020-08-03 DIAGNOSIS — R42 VERTIGO: Primary | ICD-10-CM

## 2020-08-03 DIAGNOSIS — H81.11 BENIGN PAROXYSMAL POSITIONAL VERTIGO OF RIGHT EAR: ICD-10-CM

## 2020-08-03 PROCEDURE — 99284 EMERGENCY DEPT VISIT MOD MDM: CPT | Performed by: EMERGENCY MEDICINE

## 2020-08-03 PROCEDURE — 99283 EMERGENCY DEPT VISIT LOW MDM: CPT

## 2020-08-03 RX ORDER — MECLIZINE HYDROCHLORIDE 25 MG/1
25 TABLET ORAL 3 TIMES DAILY PRN
Qty: 25 TABLET | Refills: 0 | Status: SHIPPED | OUTPATIENT
Start: 2020-08-03 | End: 2020-12-03 | Stop reason: CLARIF

## 2020-08-03 NOTE — ED PROVIDER NOTES
History  Chief Complaint   Patient presents with    Vertigo - Recurrent     pt states she woke up yesterday and the crystals in her right ear shifted and she became dizzy, hx of vertigo, states room spinning, feels better today     Patient is a 82year old female with intermittent vertigo mainly on right side since yesterday  Feels better now  Has had prior vertigo and states she believes a crystal has moved  No headache  No fever  No N/V  No difficulty hearing or tinnitus  No travel  Was last seen in this ED on 20 for right knee pain  [facility] website checked on this patient and no Rx found  Wants epley maneuver performed in ED  Declines tests in ED  History provided by:  Patient and spouse   used: No        Prior to Admission Medications   Prescriptions Last Dose Informant Patient Reported? Taking? Acetaminophen-Caffeine (EXCEDRIN TENSION HEADACHE) 500-65 MG TABS  Self Yes No   lisinopril (ZESTRIL) 5 mg tablet  Self Yes No   Sig: Take 5 mg by mouth daily     metoprolol succinate (TOPROL-XL) 25 mg 24 hr tablet   No No   Sig: Take 1 tab in the am and 2 tabs in the pm      Facility-Administered Medications: None       Past Medical History:   Diagnosis Date    Cardiac disease     History of transfusion     Hypertension     MI, old     Palpitations     SOB (shortness of breath)        Past Surgical History:   Procedure Laterality Date     SECTION      JOINT REPLACEMENT      right     CA TOTAL KNEE ARTHROPLASTY Left 2018    Procedure: ARTHROPLASTY KNEE TOTAL;  Surgeon: Senait Fragoso MD;  [facility];  Service: Orthopedics    TONSILLECTOMY      TOTAL KNEE ARTHROPLASTY Right        Family History   Problem Relation Age of Onset    Breast cancer Sister 72    No Known Problems Mother     No Known Problems Father     Hypothyroidism Daughter      I have reviewed and agree with the history as documented      E-Cigarette/Vaping    E-Cigarette Use Never User      E-Cigarette/Vaping Substances     Social History     Tobacco Use    Smoking status: Never Smoker    Smokeless tobacco: Never Used   Substance Use Topics    Alcohol use: No    Drug use: No       Review of Systems   Constitutional: Negative for fever  HENT: Negative for hearing loss and tinnitus  Gastrointestinal: Negative for nausea and vomiting  Neurological: Negative for headaches  (+) vertigo   All other systems reviewed and are negative  Physical Exam  Physical Exam  Vitals signs and nursing note reviewed  Constitutional:       General: She is not in acute distress  Appearance: Normal appearance  HENT:      Head: Normocephalic and atraumatic  Right Ear: Tympanic membrane, ear canal and external ear normal       Left Ear: Tympanic membrane, ear canal and external ear normal       Mouth/Throat:      Mouth: Mucous membranes are moist       Pharynx: No posterior oropharyngeal erythema  Eyes:      General: No scleral icterus  Extraocular Movements: Extraocular movements intact  Pupils: Pupils are equal, round, and reactive to light  Neck:      Musculoskeletal: Normal range of motion and neck supple  No muscular tenderness  Cardiovascular:      Rate and Rhythm: Normal rate and regular rhythm  Heart sounds: Normal heart sounds  No murmur  Pulmonary:      Effort: Pulmonary effort is normal  No respiratory distress  Breath sounds: Normal breath sounds  Abdominal:      General: Bowel sounds are normal       Palpations: Abdomen is soft  Tenderness: There is no abdominal tenderness  Musculoskeletal:         General: No deformity  Right lower leg: No edema  Left lower leg: No edema  Skin:     General: Skin is warm and dry  Findings: No erythema or rash  Neurological:      General: No focal deficit present  Mental Status: She is alert and oriented to person, place, and time  Cranial Nerves: No cranial nerve deficit  Coordination: Coordination normal (FTN and HTS intact bilaterally  )  Comments: No nystagmus  Psychiatric:         Mood and Affect: Mood normal          Vital Signs  ED Triage Vitals [08/03/20 0646]   Temperature Pulse Respirations Blood Pressure SpO2   97 9 °F (36 6 °C) 67 16 158/79 96 %      Temp Source Heart Rate Source Patient Position - Orthostatic VS BP Location FiO2 (%)   Oral Monitor -- -- --      Pain Score       --           Vitals:    08/03/20 0646   BP: 158/79   Pulse: 67         Visual Acuity      ED Medications  Medications - No data to display    Diagnostic Studies  Results Reviewed     None                 No orders to display              Procedures  Procedures         ED Course  ED Course as of Aug 03 0733   [place] Aug 03, 2020   0729 Epley maneuver performed by me with patient becoming symptomatic on right side which then dissipated and patient feels better now  MDM  Number of Diagnoses or Management Options  Diagnosis management comments: DDx including but not limited to: Labyrinthitis, vestibular neuronitis,benign paroxysmal positional vertigo, Meniere's disease; doubt metabolic abnormality, otitis media, tumor, intracranial process, cardiac etiology; doubt vertebral or carotid artery dissection          Amount and/or Complexity of Data Reviewed  Decide to obtain previous medical records or to obtain history from someone other than the patient: yes  Review and summarize past medical records: yes          Disposition  Final diagnoses:   Vertigo   Benign paroxysmal positional vertigo of right ear     Time reflects when diagnosis was documented in both MDM as applicable and the Disposition within this note     Time User Action Codes Description Comment    8/3/2020  7:31 AM Chris Garcia Add [R42] Vertigo     8/3/2020  7:31 AM Chris Radha Add [H81 11] Benign paroxysmal positional vertigo of right ear       ED Disposition     ED Disposition Condition Date/Time Comment    Discharge Stable Mon Aug 3, 2020  7:31 AM Jacques Julio discharge to home/self care  Follow-up Information     Follow up With Specialties Details Why Contact Info Additional Information    Adelina Schuster,  Family Medicine Call in 1 day Return sooner if increased vertigo, pain, fever, vomiting, weakness, lethargy, difficulty walking, numbness  [address] [facility] [address] [phone]       [facility] Call in 1 day  [address]  [address] [facility], [address] [facility] [address] [facility], [address]          Patient's Medications   Discharge Prescriptions    MECLIZINE (ANTIVERT) 25 MG TABLET    Take 1 tablet (25 mg total) by mouth 3 (three) times a day as needed for dizziness for up to 7 days       Start Date: 8/3/2020  End Date: 8/10/2020       Order Dose: 25 mg       Quantity: 25 tablet    Refills: 0     No discharge procedures on file      PDMP Review       Value Time User    PDMP Reviewed  Yes 8/3/2020  6:32 AM Quinton Cervantes MD          ED Provider  Electronically Signed by           Quinton Cervantes MD  08/03/20 9205

## 2020-08-21 ENCOUNTER — TELEPHONE (OUTPATIENT)
Dept: INTERVENTIONAL RADIOLOGY/VASCULAR | Facility: HOSPITAL | Age: 85
End: 2020-08-21

## 2020-08-21 NOTE — TELEPHONE ENCOUNTER
Pre procedure instructions reviewed, no blood thinners   Pt has disc with images and was instructed to bring it with her and arrive 15 min early

## 2020-08-28 ENCOUNTER — TELEPHONE (OUTPATIENT)
Dept: SURGICAL ONCOLOGY | Facility: CLINIC | Age: 85
End: 2020-08-28

## 2020-09-23 ENCOUNTER — HOSPITAL ENCOUNTER (OUTPATIENT)
Dept: RADIOLOGY | Facility: HOSPITAL | Age: 85
Discharge: HOME | End: 2020-09-23
Attending: SURGERY
Payer: COMMERCIAL

## 2020-09-23 ENCOUNTER — TRANSCRIBE ORDERS (OUTPATIENT)
Dept: RADIOLOGY | Facility: HOSPITAL | Age: 85
End: 2020-09-23

## 2020-09-23 DIAGNOSIS — E04.1 THYROID NODULE: ICD-10-CM

## 2020-09-23 PROCEDURE — 10005 FNA BX W/US GDN 1ST LES: CPT

## 2020-09-23 PROCEDURE — 88173 CYTOPATH EVAL FNA REPORT: CPT | Performed by: PATHOLOGY

## 2020-09-23 PROCEDURE — 88172 CYTP DX EVAL FNA 1ST EA SITE: CPT | Performed by: PATHOLOGY

## 2020-09-23 RX ORDER — LIDOCAINE HYDROCHLORIDE 10 MG/ML
4 INJECTION, SOLUTION EPIDURAL; INFILTRATION; INTRACAUDAL; PERINEURAL ONCE
Status: DISCONTINUED | OUTPATIENT
Start: 2020-09-23 | End: 2020-09-24 | Stop reason: HOSPADM

## 2020-10-06 ENCOUNTER — OFFICE VISIT (OUTPATIENT)
Dept: SURGICAL ONCOLOGY | Facility: CLINIC | Age: 85
End: 2020-10-06
Payer: COMMERCIAL

## 2020-10-06 VITALS
BODY MASS INDEX: 27.49 KG/M2 | DIASTOLIC BLOOD PRESSURE: 76 MMHG | HEIGHT: 64 IN | HEART RATE: 66 BPM | TEMPERATURE: 98.2 F | WEIGHT: 161 LBS | SYSTOLIC BLOOD PRESSURE: 128 MMHG | RESPIRATION RATE: 16 BRPM

## 2020-10-06 DIAGNOSIS — E04.1 THYROID NODULE: Primary | ICD-10-CM

## 2020-10-06 PROCEDURE — 99212 OFFICE O/P EST SF 10 MIN: CPT | Performed by: SURGERY

## 2020-12-02 PROBLEM — I47.1 PSVT (PAROXYSMAL SUPRAVENTRICULAR TACHYCARDIA) (HCC): Status: ACTIVE | Noted: 2020-12-02

## 2020-12-02 PROBLEM — I47.10 PSVT (PAROXYSMAL SUPRAVENTRICULAR TACHYCARDIA): Status: ACTIVE | Noted: 2020-12-02

## 2020-12-03 ENCOUNTER — OFFICE VISIT (OUTPATIENT)
Dept: CARDIOLOGY CLINIC | Facility: CLINIC | Age: 85
End: 2020-12-03
Payer: COMMERCIAL

## 2020-12-03 VITALS
WEIGHT: 160.7 LBS | HEART RATE: 70 BPM | BODY MASS INDEX: 27.43 KG/M2 | DIASTOLIC BLOOD PRESSURE: 76 MMHG | SYSTOLIC BLOOD PRESSURE: 118 MMHG | HEIGHT: 64 IN | OXYGEN SATURATION: 98 %

## 2020-12-03 DIAGNOSIS — I10 ESSENTIAL HYPERTENSION: ICD-10-CM

## 2020-12-03 DIAGNOSIS — I47.1 PSVT (PAROXYSMAL SUPRAVENTRICULAR TACHYCARDIA) (HCC): Primary | ICD-10-CM

## 2020-12-03 PROCEDURE — 93000 ELECTROCARDIOGRAM COMPLETE: CPT | Performed by: INTERNAL MEDICINE

## 2020-12-03 PROCEDURE — 99214 OFFICE O/P EST MOD 30 MIN: CPT | Performed by: INTERNAL MEDICINE

## 2021-01-16 ENCOUNTER — IMMUNIZATIONS (OUTPATIENT)
Dept: FAMILY MEDICINE CLINIC | Facility: HOSPITAL | Age: 86
End: 2021-01-16

## 2021-01-16 DIAGNOSIS — Z23 ENCOUNTER FOR IMMUNIZATION: Primary | ICD-10-CM

## 2021-01-16 PROCEDURE — 0001A SARS-COV-2 / COVID-19 MRNA VACCINE (PFIZER-BIONTECH) 30 MCG: CPT

## 2021-01-16 PROCEDURE — 91300 SARS-COV-2 / COVID-19 MRNA VACCINE (PFIZER-BIONTECH) 30 MCG: CPT

## 2021-02-04 ENCOUNTER — IMMUNIZATIONS (OUTPATIENT)
Dept: FAMILY MEDICINE CLINIC | Facility: HOSPITAL | Age: 86
End: 2021-02-04

## 2021-02-04 DIAGNOSIS — Z23 ENCOUNTER FOR IMMUNIZATION: Primary | ICD-10-CM

## 2021-02-04 PROCEDURE — 91300 SARS-COV-2 / COVID-19 MRNA VACCINE (PFIZER-BIONTECH) 30 MCG: CPT

## 2021-02-04 PROCEDURE — 0002A SARS-COV-2 / COVID-19 MRNA VACCINE (PFIZER-BIONTECH) 30 MCG: CPT

## 2021-03-02 ENCOUNTER — TELEPHONE (OUTPATIENT)
Dept: CARDIOLOGY CLINIC | Facility: CLINIC | Age: 86
End: 2021-03-02

## 2021-03-02 DIAGNOSIS — R00.2 PALPITATIONS: Primary | ICD-10-CM

## 2021-03-02 DIAGNOSIS — I47.1 PSVT (PAROXYSMAL SUPRAVENTRICULAR TACHYCARDIA) (HCC): ICD-10-CM

## 2021-03-02 NOTE — TELEPHONE ENCOUNTER
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

## 2021-03-02 NOTE — TELEPHONE ENCOUNTER
Spoke with pt  MD instructions given and verbalized understanding  Phone # for CS given as she would like to have Holter applied closer to home  I did advise pt to contact [facility] if she has any further concerns with tachy/palps she feels is concerning before she has Holter applied or while waiting for results

## 2021-08-19 DIAGNOSIS — R00.2 PALPITATIONS: ICD-10-CM

## 2021-08-19 RX ORDER — METOPROLOL SUCCINATE 25 MG/1
TABLET, EXTENDED RELEASE ORAL
Qty: 270 TABLET | Refills: 5 | Status: SHIPPED | OUTPATIENT
Start: 2021-08-19

## 2021-09-30 ENCOUNTER — TELEPHONE (OUTPATIENT)
Dept: GYNECOLOGIC ONCOLOGY | Facility: CLINIC | Age: 86
End: 2021-09-30

## 2021-09-30 NOTE — TELEPHONE ENCOUNTER
Asked PT if she wanted to reschedule appt and that I noticed she had canceled her US   She told me that she meant to cancel both appt and US and that she did not wish to reschedule

## 2022-09-15 DIAGNOSIS — R00.2 PALPITATIONS: ICD-10-CM

## 2022-09-15 RX ORDER — METOPROLOL SUCCINATE 25 MG/1
TABLET, EXTENDED RELEASE ORAL
Qty: 270 TABLET | Refills: 5 | Status: SHIPPED | OUTPATIENT
Start: 2022-09-15

## 2022-10-18 ENCOUNTER — TELEPHONE (OUTPATIENT)
Dept: HEMATOLOGY ONCOLOGY | Facility: CLINIC | Age: 87
End: 2022-10-18

## 2022-10-18 NOTE — TELEPHONE ENCOUNTER
Scheduling Appointment SEND TO [facility]    Who Is Calling to Schedule self   Doctor [facility]   Location [facility]   Date and Time 11/1 2:45PM   Reason for scheduling appointment Follow up   Patient verbalized understanding   yes

## 2022-11-01 ENCOUNTER — OFFICE VISIT (OUTPATIENT)
Dept: SURGICAL ONCOLOGY | Facility: CLINIC | Age: 87
End: 2022-11-01

## 2022-11-01 ENCOUNTER — TELEPHONE (OUTPATIENT)
Dept: HEMATOLOGY ONCOLOGY | Facility: CLINIC | Age: 87
End: 2022-11-01

## 2022-11-01 VITALS
RESPIRATION RATE: 12 BRPM | TEMPERATURE: 98.8 F | DIASTOLIC BLOOD PRESSURE: 76 MMHG | WEIGHT: 161 LBS | OXYGEN SATURATION: 94 % | SYSTOLIC BLOOD PRESSURE: 128 MMHG | BODY MASS INDEX: 27.49 KG/M2 | HEIGHT: 64 IN | HEART RATE: 66 BPM

## 2022-11-01 DIAGNOSIS — E04.1 THYROID NODULE: Primary | ICD-10-CM

## 2022-11-01 NOTE — PROGRESS NOTES
Surgical Oncology Follow Up       [address] [facility],[address] [place] [address]  [facility]  [address] [facility] [address]    Lizabeth Velasco  1935  [id number]  [address] [place] [address]  [facility] [place]  [address] [address]    Diagnoses and all orders for this visit:    Thyroid nodule        Chief Complaint   Patient presents with   • Follow-up       Return in about 2 years (around 11/1/2024) for Office Visit, Imaging - See orders  Staging:    Treatment history:  Left lower pole thyroid nodule, September 2020  Atco II  Current treatment:  Observation  Disease status:  Stable      History of Present Illness: The patient returns to the office today in follow-up for thyroid nodules  These were found incidentally on a chest CT in February of 2020  Subsequent ultrasound and biopsy showed no evidence of malignancy  It has been over 2 years since her last imaging  She denies any difficulty swallowing or breathing, changes in her voice, or pressure in her throat  She denies any personal history of radiation to the head or neck, or family history of thyroid cancer  Repeat US was performed on October 6, 2022, at an outside facility  I do not have access to these images, but I have personally reviewed the report and discussed it with the patient today  I have also reviewed her most recent thyroid function studies from August 2022  Review of Systems   Constitutional: Negative  Negative for activity change, appetite change, fatigue and unexpected weight change  HENT: Negative  Negative for trouble swallowing and voice change  Eyes: Negative  Respiratory: Negative  Negative for cough, choking and shortness of breath  Cardiovascular: Negative  Gastrointestinal: Negative  Endocrine: Negative  Genitourinary: Negative  Musculoskeletal: Negative  Skin: Negative      Allergic/Immunologic: Negative  Neurological: Negative  Hematological: Negative  Negative for adenopathy  Psychiatric/Behavioral: Negative  Patient Active Problem List   Diagnosis   • S/P total knee arthroplasty, left   • Aftercare following left knee joint replacement surgery   • Fatigue   • Chronic pain of left knee   • Thyroid nodule   • PSVT (paroxysmal supraventricular tachycardia) (HCC)   • Essential hypertension     Past Medical History:   Diagnosis Date   • Cardiac disease    • History of transfusion    • Hypertension    • MI, old    • Palpitations    • SOB (shortness of breath)      Past Surgical History:   Procedure Laterality Date   •  SECTION     • JOINT REPLACEMENT      right    • DC TOTAL KNEE ARTHROPLASTY Left 2018    Procedure: ARTHROPLASTY KNEE TOTAL;  Surgeon: Sharon Arias MD;  Location:  MAIN OR;  Service: Orthopedics   • TONSILLECTOMY     • TOTAL KNEE ARTHROPLASTY Right    • US GUIDED THYROID BIOPSY  2020     Family History   Problem Relation Age of Onset   • Breast cancer Sister 72   • No Known Problems Mother    • No Known Problems Father    • Hypothyroidism Daughter      Social History     Socioeconomic History   • Marital status:       Spouse name: Not on file   • Number of children: Not on file   • Years of education: Not on file   • Highest education level: Not on file   Occupational History   • Not on file   Tobacco Use   • Smoking status: Never Smoker   • Smokeless tobacco: Never Used   Vaping Use   • Vaping Use: Never used   Substance and Sexual Activity   • Alcohol use: No   • Drug use: No   • Sexual activity: Not on file   Other Topics Concern   • Not on file   Social History Narrative   • Not on file     Social Determinants of Health     Financial Resource Strain: Not on file   Food Insecurity: Not on file   Transportation Needs: Not on file   Physical Activity: Not on file   Stress: Not on file   Social Connections: Not on file   Intimate Partner Violence: Not on file   Housing Stability: Not on file       Current Outpatient Medications:   •  Acetaminophen-Caffeine 500-65 MG TABS, , Disp: , Rfl:   •  lisinopril (ZESTRIL) 5 mg tablet, Take 5 mg by mouth daily  , Disp: , Rfl:   •  metoprolol succinate (TOPROL-XL) 25 mg 24 hr tablet, TAKE 1 TAB IN THE AM AND 2 TABS IN THE PM, Disp: 270 tablet, Rfl: 5  No Known Allergies  Vitals:    11/01/22 1450   BP: 128/76   Pulse: 66   Resp: 12   Temp: 98 8 °F (37 1 °C)   SpO2: 94%       Physical Exam  Vitals reviewed  Constitutional:       General: She is not in acute distress  Appearance: Normal appearance  She is normal weight  She is not ill-appearing or toxic-appearing  HENT:      Head: Normocephalic and atraumatic  Nose: Nose normal       Mouth/Throat:      Mouth: Mucous membranes are moist    Eyes:      Extraocular Movements: Extraocular movements intact  Conjunctiva/sclera: Conjunctivae normal       Pupils: Pupils are equal, round, and reactive to light  Neck:      Thyroid: No thyroid mass or thyroid tenderness  Trachea: Trachea normal       Comments: Thyroid is firm and mobile, left somewhat larger than right  Cardiovascular:      Rate and Rhythm: Normal rate and regular rhythm  Pulmonary:      Effort: Pulmonary effort is normal       Breath sounds: Normal breath sounds  Chest:   Breasts:      Right: No supraclavicular adenopathy  Left: No supraclavicular adenopathy  Musculoskeletal:         General: Normal range of motion  Cervical back: Normal range of motion and neck supple  No rigidity or tenderness  Lymphadenopathy:      Cervical: No cervical adenopathy  Upper Body:      Right upper body: No supraclavicular adenopathy  Left upper body: No supraclavicular adenopathy  Skin:     General: Skin is warm and dry  Neurological:      General: No focal deficit present  Mental Status: She is alert and oriented to person, place, and time     Psychiatric:         Mood and Affect: Mood normal          Behavior: Behavior normal          Thought Content: Thought content normal          Judgment: Judgment normal              Labs:  TSH, 3RD GENERATION     Ref Range & Units 8/12/22  7:50 AM   Thyroid Stimulating Hormone 0 36 - 3 74 uIU/mL 2 67          Imaging  Ultrasound of the thyroid  10/06/2022    Findings:     Right thyroid lobe: 5 0 x 2 0 x 1 5 cm   Left thyroid lobe: 5 9 x 2 7 x 2 3 cm   Thyroid gland echotexture: heterogeneous   Thyroid gland vascularity on color Doppler: increased     Nodules Right Lobe:     0 6 x 0 7 x 0 7 cm (previous 0 6 x 0 7 x 0 6 cm) hypoechoic heterogeneous solid   circumscribed in the lower pole, stable     0 3 x 0 3 x 0 3 cm cyst in the midpole     Nodules Left Lobe:     2 9 x 2 1 x 2 0 cm (previous 2 9 x 1 5 x 2 1 cm) complex mixed cystic and solid   nodule with vascular flow in the mid to lower pole, stable     0 4 x 0 3 x 0 3 cm colloid cyst in the lower pole       Isthmus measures 0 2 cm       IMPRESSION:   IMPRESSION: Hypervascular thyroid with enlarged left thyroid and dominant stable   2 9 cm nodule  Sonographic pattern and FNA recommendations are based on American Thyroid   Association (DORI) guidelines for assessment of thyroid nodules, as agreed upon   by multidisciplinary departments at [facility]       Sonographic pattern   Benign pattern (0% risk): no biopsy   Very low suspicion pattern (<3% risk): biopsy if > or = 2 cm (or ultrasound   observation)   Low suspicion pattern (5-10% risk): biopsy if > or = 1 5 cm   Intermediate suspicion pattern (10-20% risk): biopsy if > or = 1 cm   High suspicion pattern (>70-90% risk): biopsy if > or = 1 cm (for nodules <1 cm,   biopsy could be considered if technically feasible)           Workstation:[id number]  Exam End: 10/06/22 12:54 PM    Specimen Collected: 10/06/22  4:06 PM Last Resulted: 10/06/22  9:50 PM   Received From: [facility]  Result Received: 11/01/22 11:12 AM       I reviewed the above laboratory and imaging data  Discussion/Summary: This is a very pleasant 83 y/o female who presents today for continued thyroid surveillance  There has been little change in the size of the dominant left-sided nodule in the past 2 years, and the patient has no risk factors for thyroid cancer  Given her age and lack of symptoms, I have offered her the option of discontinuing surveillance, vs 1- or 2-yr follow-up  She is comfortable with another US in 2 years  I think this is reasonable  We will see her again in 2 years with repeat US  She is agreeable to the plan, all questions have been answered  I will attempt to obtain a disk from [facility] to have her images reviewed, and will call her if there are any unexpected findings

## 2022-11-01 NOTE — TELEPHONE ENCOUNTER
Patient calling regarding a text message she received from Aruna Faria instructing her to bring a referral to her appointment today with Destiney Rowland    I advised patient that if her insurance does not require a referral (which is what she explained) she does not need one  She is a follow up for this visit 
13-Aug-2020 18:24

## 2023-06-05 ENCOUNTER — OFFICE VISIT (OUTPATIENT)
Dept: ENDOCRINOLOGY | Facility: CLINIC | Age: 88
End: 2023-06-05
Payer: COMMERCIAL

## 2023-06-05 VITALS
DIASTOLIC BLOOD PRESSURE: 82 MMHG | OXYGEN SATURATION: 97 % | HEIGHT: 64 IN | HEART RATE: 71 BPM | WEIGHT: 162 LBS | BODY MASS INDEX: 27.66 KG/M2 | SYSTOLIC BLOOD PRESSURE: 132 MMHG | TEMPERATURE: 97.6 F

## 2023-06-05 DIAGNOSIS — E21.0 PRIMARY HYPERPARATHYROIDISM (HCC): Primary | ICD-10-CM

## 2023-06-05 DIAGNOSIS — M81.0 AGE-RELATED OSTEOPOROSIS WITHOUT CURRENT PATHOLOGICAL FRACTURE: ICD-10-CM

## 2023-06-05 DIAGNOSIS — E04.1 THYROID NODULE: ICD-10-CM

## 2023-06-05 DIAGNOSIS — R53.83 FATIGUE, UNSPECIFIED TYPE: ICD-10-CM

## 2023-06-05 PROCEDURE — 99204 OFFICE O/P NEW MOD 45 MIN: CPT | Performed by: INTERNAL MEDICINE

## 2023-06-05 RX ORDER — SERTRALINE HYDROCHLORIDE 25 MG/1
TABLET, FILM COATED ORAL
COMMUNITY
Start: 2023-03-18

## 2023-06-05 NOTE — PROGRESS NOTES
New consult note      CC: Thyroid dysfunction, thyroid nodule, elevated PTH    History of Present Illness:   43-year-old female with history of bilateral multinodular thyroid, 2 9 cm left dominant thyroid nodule, primary hyperparathyroidism, osteoporosis and vitamin D deficiency  She was previously seen by Dr Brandy Padgett, endocrinologist at [facility] in   No History of external radiation, recent Iodine loading or radiological diagnostic studies  No difficulty with swallowing or breathing or change in voice  10/6/2022 US thyroid: Heterogeneous vascular thyroid  #1 right lower pole 0 7 cm hypoechoic heterogeneous solid circumscribed nodule  #2 left mid to lower pole 2 9 cm complex mixed cystic and solid nodule with vascular flow  Bilateral subcentimeter cysts  20 FNAC - Lt mower pole - benign  Family Hx of thyroid cancers: no      2017 DEXA: T-scores -2 5 LS, -2 6 LTH, -3 0 LFN   2020 DEXA: T-scores -2 6 LS, -2 7 LTH, -3 2 LFN  10-year FRAX score not reported      Patient Active Problem List   Diagnosis   • S/P total knee arthroplasty, left   • Aftercare following left knee joint replacement surgery   • Fatigue   • Chronic pain of left knee   • Thyroid nodule   • PSVT (paroxysmal supraventricular tachycardia) (HCC)   • Essential hypertension     Past Medical History:   Diagnosis Date   • Cardiac disease    • History of transfusion    • Hypertension    • MI, old    • Palpitations    • SOB (shortness of breath)       Past Surgical History:   Procedure Laterality Date   •  SECTION     • JOINT REPLACEMENT      right    • CT ARTHRP KNE CONDYLE&PLATU MEDIAL&LAT COMPARTMENTS Left 2018    Procedure: ARTHROPLASTY KNEE TOTAL;  Surgeon: Shana Pulido MD;  [facility];  Service: Orthopedics   • TONSILLECTOMY     • TOTAL KNEE ARTHROPLASTY Right    • US GUIDED THYROID BIOPSY  2020      Family History   Problem Relation Age of Onset   • Breast cancer "Sister 72   • No Known Problems Mother    • No Known Problems Father    • Hypothyroidism Daughter      Social History     Tobacco Use   • Smoking status: Never   • Smokeless tobacco: Never   Substance Use Topics   • Alcohol use: No     No Known Allergies      Review of Systems   HENT: Negative  Eyes: Negative  Respiratory: Negative  Cardiovascular: Negative  Gastrointestinal: Negative  Endocrine: Negative  Musculoskeletal: Negative  Skin: Negative  Allergic/Immunologic: Negative  Neurological: Negative  Hematological: Negative  Psychiatric/Behavioral: Negative  Current Outpatient Medications:   •  Acetaminophen-Caffeine 500-65 MG TABS, , Disp: , Rfl:   •  lisinopril (ZESTRIL) 5 mg tablet, Take 5 mg by mouth daily  , Disp: , Rfl:   •  metoprolol succinate (TOPROL-XL) 25 mg 24 hr tablet, TAKE 1 TAB IN THE AM AND 2 TABS IN THE PM, Disp: 270 tablet, Rfl: 5  •  sertraline (ZOLOFT) 25 mg tablet, TAKE 1 TABLET (25 MG TOTAL) BY MOUTH DAILY  (Patient not taking: Reported on 6/5/2023), Disp: , Rfl:     Physical Exam:  Body mass index is 27 81 kg/m²  /82 (BP Location: Left arm, Patient Position: Sitting, Cuff Size: Standard)   Pulse 71   Temp 97 6 °F (36 4 °C) (Tympanic)   Ht 5' 4\" (1 626 m)   Wt 73 5 kg (162 lb)   SpO2 97%   BMI 27 81 kg/m²        Physical Exam  Constitutional:       General: She is not in acute distress  Appearance: She is well-developed  She is not ill-appearing  HENT:      Head: Normocephalic and atraumatic  Nose: Nose normal       Mouth/Throat:      Pharynx: Oropharynx is clear  Eyes:      Extraocular Movements: Extraocular movements intact  Conjunctiva/sclera: Conjunctivae normal    Neck:      Thyroid: No thyromegaly  Cardiovascular:      Rate and Rhythm: Normal rate  Pulmonary:      Effort: Pulmonary effort is normal    Musculoskeletal:         General: No deformity  Cervical back: Normal range of motion     Skin:     " "Capillary Refill: Capillary refill takes less than 2 seconds  Coloration: Skin is not pale  Findings: No rash  Neurological:      Mental Status: She is alert and oriented to person, place, and time  Psychiatric:         Behavior: Behavior normal          Labs:     Lab Results   Component Value Date    [id number] 1 590 06/01/2017       Lab Results   Component Value Date    BUN 15 05/02/2018     05/02/2018    CO2 27 05/02/2018    CREATININE 0 96 05/02/2018    CREATININE 0 97 05/01/2018    CREATININE 1 01 04/11/2018    K 4 3 05/02/2018     08/09/2015     eGFR   Date Value Ref Range Status   05/02/2018 56 ml/min/1 73sq m Final       Lab Results   Component Value Date    ALKPHOS 116 08/09/2015    ALT 30 08/09/2015    AST 23 08/09/2015    BILITOT 0 32 08/09/2015       No results found for: \"CHOLESTEROL\"  No results found for: \"HDL\"  No results found for: \"TRIG\"  No results found for: \"NONHDLC\"      Impression:  1  Primary hyperparathyroidism ([address] )    2  Thyroid nodule    3  Fatigue, unspecified type    4  Age-related osteoporosis without current pathological fracture         Plan:    Diagnoses and all orders for this visit:    Primary hyperparathyroidism ([address] )  She has mild primary hyperparathyroidism with fluctuating high normal calcium and normal to high PTH  She had the diagnosis since 2020 but elected not to undergo further testing or surgical evaluation  She is asymptomatic and only indication for potential surgery would be osteoporosis  Today we discussed all aspects of hyperparathyroidism, both primary and secondary, normal parathyroid physiology, pathophysiology, review of data, treatment options, rule of sestamibi/CT parathyroid and follow up needs  We agreed to check a sestamibi and a 24-hour urine for calcium to understand calcium metabolism parathyroid activity better as well as for localization of potential parathyroid adenoma      We will review data and based on that the " patient will make further informed management choices  Follow-up in 3 months     -     NM parathyroid scan w spect; Future  -     Creatinine, urine, 24 hour; Future  -     Calcium, urine, 24 hour; Future  -     Vitamin D 25 hydroxy; Future    Thyroid nodule  She seems to have a heterogeneous hypervascular left thyroid lobe with a complex cystic 2 9 cm thyroid nodule that is stable since 2020  She had a benign biopsy in 9/2020  She also reports a repeat biopsy apparently benign this year but I do not have record of that  Today we discussed options and agreed to just monitor  We will get the recent biopsy report and if it was negative, she may just need surveillance with a repeat ultrasound in 1 to 2 years  -     T4, free; Future  -     TSH, 3rd generation; Future    Age-related osteoporosis without current pathological fracture  She probably has mostly age-related osteoporosis however will need to rule out PTH mediated secondary osteoporosis component  Today we discussed all aspects of osteoporosis including pathophysiology, risk factors, complications, diet, calcium 1200mg/day, vitamin D supplementation to maintain goal >30ng/dL, lifestyle modifications, medical fitness training, goals of therapy, follow up needs and medications  We will review medications next time but she definitely qualifies for treatment  We will do testing as listed above and as below based on which we will make further decisions on medical therapy next visit  -     DXA bone density spine hip and pelvis; Future  -     DXA forearm wrist and heel; Future  -     Protein electrophoresis, urine  -     Protein electrophoresis, serum; Future  -     NTX Panel, Urine  -     Phosphorus; Future  -     Comprehensive metabolic panel; Future        I have spent 42 minutes with patient today in which greater than 50% of this time was spent in counseling/coordination of care  All questioned fully answered   She will call me if any problems arise  Educated/ Counseled patient on diagnostic test results, prognosis, risk vs benefit of treatment options, importance of treatment compliance, healthy life and lifestyle choices        [address]

## 2023-09-06 ENCOUNTER — TELEPHONE (OUTPATIENT)
Dept: ENDOCRINOLOGY | Facility: CLINIC | Age: 88
End: 2023-09-06

## 2023-09-06 NOTE — TELEPHONE ENCOUNTER
Please call patient and reschedule follow up appointment also please advise to have labs done prior to visit.        Thank you

## 2023-09-08 NOTE — TELEPHONE ENCOUNTER
F/u rescheduled for December 1st, reason being the pt said she has a DXA scan scheduled with [facility] through her primary care on 11/21. She also has one scheduled with [facility] on 10/17 that Dr. Juan Edmonds ordered. Pt said she wants to continue with the 11/21 scan and asked me to cancel the 10/17. I contacted her primary care because I wanted to make sure it was the same test before canceling one. Ours specifies forearm, wrist, heel, and wrist/pelvis, but PCP's order does not specify any of this. I was not told where the pt is getting her DXA with [facility], so I cannot contact their facility for more information.

## 2023-09-29 ENCOUNTER — HOSPITAL ENCOUNTER (OUTPATIENT)
Dept: ULTRASOUND IMAGING | Facility: HOSPITAL | Age: 88
Discharge: HOME | End: 2023-09-29
Payer: COMMERCIAL

## 2023-09-29 DIAGNOSIS — K11.8 MASS OF LEFT PAROTID GLAND: ICD-10-CM

## 2023-09-29 PROCEDURE — 76536 US EXAM OF HEAD AND NECK: CPT

## 2023-10-04 DIAGNOSIS — R00.2 PALPITATIONS: ICD-10-CM

## 2023-10-04 RX ORDER — METOPROLOL SUCCINATE 25 MG/1
TABLET, EXTENDED RELEASE ORAL
Qty: 90 TABLET | Refills: 0 | Status: SHIPPED | OUTPATIENT
Start: 2023-10-04

## 2023-10-04 NOTE — TELEPHONE ENCOUNTER
Requested medication(s) are due for refill today: Yes  Patient has already received a courtesy refill: No  Other reason request has been forwarded to provider: appointment scheduled on 11/30

## 2023-10-28 DIAGNOSIS — R00.2 PALPITATIONS: ICD-10-CM

## 2023-10-30 RX ORDER — METOPROLOL SUCCINATE 25 MG/1
TABLET, EXTENDED RELEASE ORAL
Qty: 270 TABLET | Refills: 3 | Status: SHIPPED | OUTPATIENT
Start: 2023-10-30

## 2023-12-06 ENCOUNTER — HOSPITAL ENCOUNTER (OUTPATIENT)
Dept: ULTRASOUND IMAGING | Facility: HOSPITAL | Age: 88
Discharge: HOME | End: 2023-12-06
Attending: OTOLARYNGOLOGY
Payer: COMMERCIAL

## 2023-12-06 DIAGNOSIS — K11.8 MASS OF LEFT PAROTID GLAND: ICD-10-CM

## 2023-12-06 PROCEDURE — 88185 FLOWCYTOMETRY/TC ADD-ON: CPT

## 2023-12-06 PROCEDURE — 10005 FNA BX W/US GDN 1ST LES: CPT

## 2023-12-06 PROCEDURE — 38505 NEEDLE BIOPSY LYMPH NODES: CPT

## 2023-12-06 PROCEDURE — 88184 FLOWCYTOMETRY/ TC 1 MARKER: CPT | Performed by: OTOLARYNGOLOGY

## 2023-12-06 PROCEDURE — 88173 CYTOPATH EVAL FNA REPORT: CPT | Performed by: STUDENT IN AN ORGANIZED HEALTH CARE EDUCATION/TRAINING PROGRAM

## 2023-12-06 RX ORDER — LIDOCAINE HYDROCHLORIDE 10 MG/ML
5 INJECTION, SOLUTION EPIDURAL; INFILTRATION; INTRACAUDAL; PERINEURAL ONCE
Status: COMPLETED | OUTPATIENT
Start: 2023-12-06 | End: 2023-12-06

## 2023-12-06 RX ADMIN — LIDOCAINE HYDROCHLORIDE 5 ML: 10 INJECTION, SOLUTION EPIDURAL; INFILTRATION; INTRACAUDAL; PERINEURAL at 09:32

## 2023-12-08 LAB — SCAN RESULT: NORMAL

## 2023-12-11 PROCEDURE — 88173 CYTOPATH EVAL FNA REPORT: CPT | Performed by: STUDENT IN AN ORGANIZED HEALTH CARE EDUCATION/TRAINING PROGRAM

## 2023-12-18 ENCOUNTER — TELEPHONE (OUTPATIENT)
Dept: ADMINISTRATIVE | Facility: HOSPITAL | Age: 88
End: 2023-12-18

## 2023-12-18 NOTE — TELEPHONE ENCOUNTER
----- Message from Anh Vargas sent at 12/18/2023  7:42 AM EST -----  GABBI Patel,   Could you please assist his patient with scheduling the testing ordered by Dr. Lawton.       Thank you     ----- Message -----  From: SYSTEM  Sent: 6/19/2023  12:56 AM EST  To: #

## 2024-01-02 ENCOUNTER — OFFICE VISIT (OUTPATIENT)
Dept: CARDIOLOGY CLINIC | Facility: CLINIC | Age: 89
End: 2024-01-02
Payer: COMMERCIAL

## 2024-01-02 VITALS
OXYGEN SATURATION: 97 % | HEIGHT: 64 IN | DIASTOLIC BLOOD PRESSURE: 66 MMHG | WEIGHT: 162.6 LBS | HEART RATE: 79 BPM | BODY MASS INDEX: 27.76 KG/M2 | SYSTOLIC BLOOD PRESSURE: 132 MMHG

## 2024-01-02 DIAGNOSIS — I10 ESSENTIAL HYPERTENSION: ICD-10-CM

## 2024-01-02 DIAGNOSIS — R00.2 PALPITATIONS: ICD-10-CM

## 2024-01-02 DIAGNOSIS — I47.10 PSVT (PAROXYSMAL SUPRAVENTRICULAR TACHYCARDIA): Primary | ICD-10-CM

## 2024-01-02 PROCEDURE — 99204 OFFICE O/P NEW MOD 45 MIN: CPT | Performed by: INTERNAL MEDICINE

## 2024-01-02 PROCEDURE — 93000 ELECTROCARDIOGRAM COMPLETE: CPT | Performed by: INTERNAL MEDICINE

## 2024-01-02 RX ORDER — METOPROLOL SUCCINATE 25 MG/1
25 TABLET, EXTENDED RELEASE ORAL 2 TIMES DAILY
Start: 2024-01-02

## 2024-01-02 NOTE — PROGRESS NOTES
Cardiology Follow Up    Lizabeth Kothari  1935  [id number]  [facility] [place] [id number] [facility] [address] [phone]    1. PSVT (paroxysmal supraventricular tachycardia)  POCT ECG    Echo complete w/ contrast if indicated    Holter monitor      2. Essential hypertension        3. Palpitations  metoprolol succinate (TOPROL-XL) 25 mg 24 hr tablet    Echo complete w/ contrast if indicated    Holter monitor          Discussion/Summary:    Presyncopal episode. History of PSVT. She decreased her dose of metoprolol but seems to be doing okay with that. Continue 25 twice daily of Toprol-XL. Repeat Holter monitor and echo to make sure there were no arrhythmias and no change in LV function.    Blood pressure control on average is quite good. She is on a low-dose of lisinopril in addition to the metoprolol.    Palpitations are less frequent than before. Repeat testing as noted above.    History of Present Illness:   88-year-old female.  I had seen her previously for symptoms of palpitations and paroxysmal SVT.  Unremarkable echocardiogram in the past.  Holter monitor with no significant arrhythmias, but she did have a stress test which showed exercised induced SVT.    Interval History:    She is overdue for follow-up. Her symptoms have been well-controlled until about a few months ago when she felt an episode of near syncope. She was not feeling palpitations at that time. She does have issues with vertigo. She made the appointment. She has not had any major episodes since then.    Her daughter mention that she may be taking too high dose of metoprolol so she decreased that she is taking 25 twice daily currently she does feel a little bit better than before.    She is not feeling any significant palpitations currently.    Testing was done with her PCP with regards to blood work in August. Done through [facility].    She has kept track of  "her blood pressure at home and it on average is controlled.      Problem List       S/P total knee arthroplasty, left    Aftercare following left knee joint replacement surgery    Fatigue    Chronic pain of left knee          Past Medical History:   Diagnosis Date   • Cardiac disease    • History of transfusion    • Hypertension    • MI, old    • Palpitations    • SOB (shortness of breath)      Social History     Tobacco Use   • Smoking status: Never   • Smokeless tobacco: Never   Vaping Use   • Vaping status: Never Used   Substance Use Topics   • Alcohol use: No   • Drug use: No      Family History   Problem Relation Age of Onset   • Breast cancer Sister 65   • No Known Problems Mother    • No Known Problems Father    • Hypothyroidism Daughter      Past Surgical History:   Procedure Laterality Date   •  SECTION     • JOINT REPLACEMENT      right    • UT ARTHRP KNE CONDYLE&PLATU MEDIAL&LAT COMPARTMENTS Left 2018    Procedure: ARTHROPLASTY KNEE TOTAL;  Surgeon: Jay Jay David MD;  [facility];  Service: Orthopedics   • TONSILLECTOMY     • TOTAL KNEE ARTHROPLASTY Right    • US GUIDED LYMPH NODE BIOPSY LEFT  2023   • US GUIDED THYROID BIOPSY  2020       Current Outpatient Medications:   •  lisinopril (ZESTRIL) 5 mg tablet, Take 5 mg by mouth daily  , Disp: , Rfl:   •  metoprolol succinate (TOPROL-XL) 25 mg 24 hr tablet, Take 1 tablet (25 mg total) by mouth 2 (two) times a day TAKE 1 TABLET IN THE MORNING AND TAKE 2 TABLETS IN THE EVENING, Disp: , Rfl:   •  sertraline (ZOLOFT) 25 mg tablet, TAKE 1 TABLET (25 MG TOTAL) BY MOUTH DAILY. (Patient not taking: Reported on 2023), Disp: , Rfl:   No Known Allergies    Vitals:    24 1352   BP: 132/66   BP Location: Left arm   Patient Position: Sitting   Cuff Size: Standard   Pulse: 79   SpO2: 97%   Weight: 73.8 kg (162 lb 9.6 oz)   Height: 5' 4\" (1.626 m)     Vitals:    24 1352   Weight: 73.8 kg (162 lb 9.6 oz)      Height: 5' 4\" " (162.6 cm)   Body mass index is 27.91 kg/m².    Physical Exam:  GEN: Lizabeth Kothari appears well, alert and oriented x 3, pleasant and cooperative   HEENT: pupils equal, round, and reactive to light; extraocular muscles intact  NECK: supple, no carotid bruits   HEART: regular rhythm, normal S1 and S2, no murmurs, clicks, gallops or rubs   LUNGS: clear to auscultation bilaterally; no wheezes, rales, or rhonchi   ABDOMEN: normal bowel sounds, soft, no tenderness, no distention  EXTREMITIES: peripheral pulses normal; no clubbing, cyanosis, or edema  NEURO: no focal findings   SKIN: normal without suspicious lesions on exposed skin    ROS:  Except as noted in HPI, is otherwise reviewed in detail and a 12 point review of systems is negative.  ROS reviewed and is unchanged    Labs:  Lab Results   Component Value Date     08/09/2015    K 4.3 05/02/2018     05/02/2018    CREATININE 0.96 05/02/2018    BUN 15 05/02/2018    CO2 27 05/02/2018    ALT 30 08/09/2015    AST 23 08/09/2015    INR 0.99 04/11/2018    GLUF 82 04/11/2018    HGBA1C 5.4 04/11/2018    WBC 11.52 (H) 05/01/2018    HGB 11.8 05/15/2018    HCT 36.1 05/15/2018     05/01/2018     Holter 8/29/19:  INDICATIONS:   48 hour Holter monitor was performed 08/29/2019 for SVT.  Average heart rate was 71 beats per minute.  Minimum heart rate was 51 beats per minute at 5:00 a.m..  Maximum heart rate 110 beats per minute at 4:05 p.m..     Ventricular ectopic activity consisted of 584 beats, comprising 0.3% of total beats.  All of these were isolated PVCs.     Supraventricular ectopic activity consisted of 202 beats, comprising 0.1% of total beats.  Six were in atrial couplets, 2 were late beats, 194 were isolated PACs.     Longest R-R interval was 1.6 seconds at 5:10 a.m..     Patient's rhythm included 5 hours 56 minutes of bradycardia.  Most of the bradycardia occurred between the hours of 11:00 p.m. to 7:00 a.m. on both days.     Patient's rhythm included 10  minutes 47 seconds of tachycardia.     No symptoms were noted.     IMPRESSIONS:  1. Sinus rhythm with occasional ventricular and rare supraventricular ectopic activity.  No episodes of SVT were seen.  2. No symptoms were noted.    EKG:Sinus rhythm, 79 BPM. Left axis deviation.

## 2024-01-10 ENCOUNTER — TELEPHONE (OUTPATIENT)
Dept: ADMINISTRATIVE | Facility: HOSPITAL | Age: 89
End: 2024-01-10

## 2024-01-10 ENCOUNTER — TELEPHONE (OUTPATIENT)
Dept: CARDIOLOGY CLINIC | Facility: CLINIC | Age: 89
End: 2024-01-10

## 2024-01-10 NOTE — TELEPHONE ENCOUNTER
Dr. Black, just an FYI that this patient's echo was denied by her insurance.  There is no P2P available.  I have already requested an appeal from her insurance.  I left a detailed message on the patient's voicemail that the scan was denied and testing cancelled.

## 2024-01-10 NOTE — TELEPHONE ENCOUNTER
Hi Dr Lawton!     I just wanted to let you know that I did reach out to pt in reference to her DXA scan and her NM scan. Pt stated she just recently had a DXA scan through her PCP which looks like it was done through [facility] back in November.       I asked pt if she would like to schedule her NM scan but she stated she would not and does not understand why she keeps getting ordered to have more test if everything else is fine.

## 2024-02-07 ENCOUNTER — HOSPITAL ENCOUNTER (OUTPATIENT)
Dept: NON INVASIVE DIAGNOSTICS | Facility: HOSPITAL | Age: 89
Discharge: HOME | End: 2024-02-07
Attending: INTERNAL MEDICINE
Payer: COMMERCIAL

## 2024-02-07 VITALS
HEART RATE: 79 BPM | SYSTOLIC BLOOD PRESSURE: 132 MMHG | BODY MASS INDEX: 27.66 KG/M2 | DIASTOLIC BLOOD PRESSURE: 66 MMHG | HEIGHT: 64 IN | WEIGHT: 162 LBS

## 2024-02-07 DIAGNOSIS — R00.2 PALPITATIONS: ICD-10-CM

## 2024-02-07 DIAGNOSIS — I47.10 PSVT (PAROXYSMAL SUPRAVENTRICULAR TACHYCARDIA): ICD-10-CM

## 2024-02-07 LAB
AORTIC ROOT: 2.9 CM
APICAL FOUR CHAMBER EJECTION FRACTION: 65 %
ASCENDING AORTA: 3.4 CM
BSA FOR ECHO PROCEDURE: 1.79 M2
E WAVE DECELERATION TIME: 318 MS
E/A RATIO: 0.69
FRACTIONAL SHORTENING: 30 (ref 28–44)
INTERVENTRICULAR SEPTUM IN DIASTOLE (PARASTERNAL SHORT AXIS VIEW): 1.3 CM
INTERVENTRICULAR SEPTUM: 1.3 CM (ref 0.6–1.1)
LAAS-AP2: 12 CM2
LAAS-AP4: 13.8 CM2
LEFT ATRIUM SIZE: 3.5 CM
LEFT ATRIUM VOLUME (MOD BIPLANE): 32 ML
LEFT ATRIUM VOLUME INDEX (MOD BIPLANE): 17.9 ML/M2
LEFT INTERNAL DIMENSION IN SYSTOLE: 2.3 CM (ref 2.1–4)
LEFT VENTRICULAR INTERNAL DIMENSION IN DIASTOLE: 3.3 CM (ref 3.5–6)
LEFT VENTRICULAR POSTERIOR WALL IN END DIASTOLE: 1.2 CM
LEFT VENTRICULAR STROKE VOLUME: 27 ML
LVSV (TEICH): 27 ML
MV E'TISSUE VEL-SEP: 8 CM/S
MV PEAK A VEL: 0.89 M/S
MV PEAK E VEL: 61 CM/S
MV STENOSIS PRESSURE HALF TIME: 92 MS
MV VALVE AREA P 1/2 METHOD: 2.39
RA PRESSURE ESTIMATED: 5 MMHG
RIGHT ATRIUM AREA SYSTOLE A4C: 15.8 CM2
RIGHT VENTRICLE ID DIMENSION: 3.4 CM
RV PSP: 28 MMHG
SL CV LEFT ATRIUM LENGTH A2C: 4.1 CM
SL CV LV EF: 65
SL CV PED ECHO LEFT VENTRICLE DIASTOLIC VOLUME (MOD BIPLANE) 2D: 45 ML
SL CV PED ECHO LEFT VENTRICLE SYSTOLIC VOLUME (MOD BIPLANE) 2D: 18 ML
TR MAX PG: 23 MMHG
TR PEAK VELOCITY: 2.4 M/S
TRICUSPID ANNULAR PLANE SYSTOLIC EXCURSION: 2 CM
TRICUSPID VALVE PEAK REGURGITATION VELOCITY: 2.42 M/S

## 2024-02-07 PROCEDURE — 93306 TTE W/DOPPLER COMPLETE: CPT | Performed by: INTERNAL MEDICINE

## 2024-02-07 PROCEDURE — 93306 TTE W/DOPPLER COMPLETE: CPT

## 2024-02-08 ENCOUNTER — HOSPITAL ENCOUNTER (OUTPATIENT)
Dept: NON INVASIVE DIAGNOSTICS | Facility: HOSPITAL | Age: 89
Discharge: HOME | End: 2024-02-08
Payer: COMMERCIAL

## 2024-02-08 DIAGNOSIS — E04.1 THYROID NODULE: Primary | ICD-10-CM

## 2024-02-08 PROCEDURE — 93225 XTRNL ECG REC<48 HRS REC: CPT

## 2024-02-08 PROCEDURE — 93226 XTRNL ECG REC<48 HR SCAN A/R: CPT

## 2024-02-08 NOTE — TELEPHONE ENCOUNTER
Parker Meza!     Just wanted to forward this encounter to you. I previously reached out to pt to have NM scan done and she did not want to have it.     Thanks!

## 2024-05-02 ENCOUNTER — TELEPHONE (OUTPATIENT)
Dept: SURGICAL ONCOLOGY | Facility: CLINIC | Age: 89
End: 2024-05-02

## 2024-05-02 NOTE — TELEPHONE ENCOUNTER
Called and spoke with patient. She is not going to make an appointment to do the US or see Dr. Morales. She does not feel the nodule changed.  She also has a $200 deductible that she does not want to pay.

## 2024-05-02 NOTE — TELEPHONE ENCOUNTER
----- Message from Alis Kelly RN sent at 5/2/2024  9:32 AM EDT -----  Patient due for thyroid US and follow up with Carmen in October. Please schedule.

## 2025-01-07 ENCOUNTER — OFFICE VISIT (OUTPATIENT)
Dept: CARDIOLOGY CLINIC | Facility: CLINIC | Age: 89
End: 2025-01-07
Payer: COMMERCIAL

## 2025-01-07 VITALS
SYSTOLIC BLOOD PRESSURE: 112 MMHG | OXYGEN SATURATION: 98 % | HEART RATE: 73 BPM | TEMPERATURE: 96 F | BODY MASS INDEX: 28.41 KG/M2 | HEIGHT: 64 IN | WEIGHT: 166.4 LBS | DIASTOLIC BLOOD PRESSURE: 82 MMHG

## 2025-01-07 DIAGNOSIS — I10 ESSENTIAL HYPERTENSION: ICD-10-CM

## 2025-01-07 DIAGNOSIS — R00.2 PALPITATIONS: ICD-10-CM

## 2025-01-07 DIAGNOSIS — I47.10 PSVT (PAROXYSMAL SUPRAVENTRICULAR TACHYCARDIA) (HCC): ICD-10-CM

## 2025-01-07 DIAGNOSIS — N17.9 AKI (ACUTE KIDNEY INJURY) (HCC): ICD-10-CM

## 2025-01-07 PROCEDURE — 99214 OFFICE O/P EST MOD 30 MIN: CPT

## 2025-01-07 PROCEDURE — 93000 ELECTROCARDIOGRAM COMPLETE: CPT

## 2025-01-07 RX ORDER — ALLOPURINOL 100 MG/1
50 TABLET ORAL DAILY
COMMUNITY
Start: 2024-12-04

## 2025-01-07 NOTE — PROGRESS NOTES
Lizabeth Kothari  1935  [id number]  [facility]  [facility] [id number] [phone]    1. PSVT (paroxysmal supraventricular tachycardia) (HCC)  POCT ECG    Zio Monitor      2. Palpitations  Zio Monitor      3. Essential hypertension        4. IVON (acute kidney injury) ([facility])  Basic metabolic panel          Summary/Discussion:  PSVT:  - hx of   - 48 hour Holter (2/2024):unremarkable 48 hour Holter. No PSVT noted   - echo (2/2024): LV wall thickness mildly increased, mild concentric hypertrophy, LVEF 65%, no WMA, G1DD, normal RV, mild AI, mild TR  - reports recent increase in the frequency of her episodes with associated symptoms of intermittent shortness of breath. Denies any chest pain, near syncope or syncope  - adherent to adequate hydration and limited caffeine intake. She is still active for her age without any significant cardiac limitations  - EKG today demonstrating sinus rhythm, left axis deviation, possible anterior infarct, age undetermined  - currently on metoprolol succinate which she reports that she has been taking 25 mg in AM and 50 mg in PM with minimal improvement in her symptoms  - recommend 2 week zio to correlate symptoms of palpitations with any cardiac rhythm abnormality   - continue metoprolol succinate 25 mg in AM and 50 mg in PM    Hypertension:  - 112/82, reports prior episodes of lightheadedness, currently asymptomatic   - recommend discontinuing her lisinopril 5 mg daily due to borderline low blood pressures and IVON noted on most recent lab work   - continue metoprolol   - lifestyle modification   - close blood pressure monitoring     IVON:  - creatinine (12/3/2024): 1.35   - discontinue lisinopril  - repeat BMP in 2 weeks to reassess renal function      Interval History: Lizabeth Kothari is a 89 y.o. year old female with history mentioned in problem list who presents to the office today for routine follow up.     Since her last office  visit she reports recent increase in the frequency of her episodes with associated symptoms of intermittent shortness of breath. Denies any chest pain, near syncope or syncope, she has been adherent to adequate hydration and limited caffeine intake. She is still active for her age without any significant cardiac limitations. She also reports prior episodes of lightheadedness which have since resolved. She denies any chest pain/pressure/discomfort or shortness of breath. She denies lower extremity edema, orthopnea, and PND.         She will RTO in 4 months with myself and in 1 year with Dr. Black or sooner if necessary. She will call with any concerns.       Medical Problems       Problem List       S/P total knee arthroplasty, left    Aftercare following left knee joint replacement surgery    Fatigue    Chronic pain of left knee    Thyroid nodule    PSVT (paroxysmal supraventricular tachycardia) (HCC)    Essential hypertension        Past Medical History:   Diagnosis Date    Cardiac disease     History of transfusion     Hypertension     MI, old     Palpitations     SOB (shortness of breath)      Social History     Socioeconomic History    Marital status:      Spouse name: Not on file    Number of children: Not on file    Years of education: Not on file    Highest education level: Not on file   Occupational History    Not on file   Tobacco Use    Smoking status: Never    Smokeless tobacco: Never   Vaping Use    Vaping status: Never Used   Substance and Sexual Activity    Alcohol use: No    Drug use: No    Sexual activity: Not on file   Other Topics Concern    Not on file   Social History Narrative    Not on file     Social Drivers of Health     Financial Resource Strain: Low Risk  (9/17/2024)    Received from [facility]    Overall Financial Resource Strain (CARDIA)     Difficulty of Paying Living Expenses: Not hard at all   Food Insecurity: No Food Insecurity (9/17/2024)    Received from  [facility]    Hunger Vital Sign     Worried About Running Out of Food in the Last Year: Never true     Ran Out of Food in the Last Year: Never true   Transportation Needs: No Transportation Needs (2024)    Received from [facility]    PRAPARE - Transportation     Lack of Transportation (Medical): No     Lack of Transportation (Non-Medical): No   Physical Activity: Not on file   Stress: No Stress Concern Present (2024)    Received from [facility]    [place] citizen [employer] - Occupational Stress Questionnaire     Feeling of Stress : Only a little   Social Connections: Feeling Socially Integrated (2024)    Received from [facility]    OASIS : Social Isolation     How often do you feel lonely or isolated from those around you?: Never   Intimate Partner Violence: Not At Risk (2024)    Received from [facility]    Humiliation, Afraid, Rape, and Kick questionnaire     Fear of Current or Ex-Partner: No     Emotionally Abused: No     Physically Abused: No     Sexually Abused: No   Housing Stability: Unknown (2024)    Received from [facility]    Housing Stability Vital Sign     Unable to Pay for Housing in the Last Year: No     Number of Times Moved in the Last Year: Not on file     Homeless in the Last Year: No      Family History   Problem Relation Age of Onset    Breast cancer Sister 65    No Known Problems Mother     No Known Problems Father     Hypothyroidism Daughter      Past Surgical History:   Procedure Laterality Date     SECTION      JOINT REPLACEMENT      right     VA ARTHRP KNE CONDYLE&PLATU MEDIAL&LAT COMPARTMENTS Left 2018    Procedure: ARTHROPLASTY KNEE TOTAL;  Surgeon: Jay Jay David MD;  Location: [facility];  Service: Orthopedics    TONSILLECTOMY      TOTAL KNEE ARTHROPLASTY Right     US GUIDED LYMPH NODE BIOPSY LEFT  2023    US GUIDED  "THYROID BIOPSY  9/23/2020       Current Outpatient Medications:     allopurinol (ZYLOPRIM) 100 mg tablet, Take 50 mg by mouth daily Takes 0.5 pills daily, Disp: , Rfl:     metoprolol succinate (TOPROL-XL) 25 mg 24 hr tablet, Take 1 tablet (25 mg total) by mouth 2 (two) times a day TAKE 1 TABLET IN THE MORNING AND TAKE 2 TABLETS IN THE EVENING, Disp: , Rfl:     sertraline (ZOLOFT) 25 mg tablet, , Disp: , Rfl:   No Known Allergies    Labs:     Chemistry        Component Value Date/Time     08/09/2015 2324    K 4.5 12/03/2024 1132     12/03/2024 1132    CO2 25 12/03/2024 1132    BUN 32 (H) 12/03/2024 1132    CREATININE 1.35 (H) 12/03/2024 1132        Component Value Date/Time    CALCIUM 10.2 12/03/2024 1132    ALKPHOS 93 09/16/2024 0906    AST 19 09/16/2024 0906    ALT 15 09/16/2024 0906    BILITOT 0.32 08/09/2015 2324            No results found for: \"CHOL\"  No results found for: \"HDL\"  No results found for: \"LDLCALC\"  No results found for: \"TRIG\"  No results found for: \"CHOLHDL\"    Imaging: No results found.    ECG:  sinus rhythm, left axis deviation, possible anterior infarct, age undetermined    Review of Systems   Cardiovascular:  Positive for irregular heartbeat and palpitations.   Neurological:  Positive for light-headedness (resolved).   All other systems reviewed and are negative.      Vitals:    01/07/25 0801   BP: 112/82   Pulse: 73   Temp: (!) 96 °F (35.6 °C)   SpO2: 98%     Vitals:    01/07/25 0801   Weight: 75.5 kg (166 lb 6.4 oz)     Height: 5' 4\" (162.6 cm)   Body mass index is 28.56 kg/m².    Physical Exam  Vitals and nursing note reviewed.   Constitutional:       General: She is not in acute distress.     Appearance: Normal appearance. She is not ill-appearing.   HENT:      Head: Normocephalic.      Nose: Nose normal.      Mouth/Throat:      Mouth: Mucous membranes are moist.      Pharynx: Oropharynx is clear.   Cardiovascular:      Rate and Rhythm: Normal rate and regular rhythm.      " Pulses: Normal pulses.      Heart sounds: Normal heart sounds. No murmur heard.  Pulmonary:      Effort: Pulmonary effort is normal.      Breath sounds: Normal breath sounds.   Musculoskeletal:         General: Normal range of motion.      Cervical back: Normal range of motion.      Right lower leg: No edema.      Left lower leg: No edema.   Skin:     General: Skin is warm and dry.   Neurological:      Mental Status: She is alert and oriented to person, place, and time.   Psychiatric:         Mood and Affect: Mood normal.         Behavior: Behavior normal.

## 2025-01-09 ENCOUNTER — TELEPHONE (OUTPATIENT)
Age: 89
End: 2025-01-09

## 2025-01-09 NOTE — TELEPHONE ENCOUNTER
Pt called back [facility] fax [phone]. Rx for bmp faxed per pt request.

## 2025-01-09 NOTE — TELEPHONE ENCOUNTER
Received a call from Lizabeth and had questions about her labs work. Advised can go to any lab that she wished.    Pt verbally understood    Advised if get the lab and fax number can always fax to that lab of choice. If outside of [facility], pt verbally understood

## 2025-01-15 ENCOUNTER — TELEPHONE (OUTPATIENT)
Dept: CARDIOLOGY CLINIC | Facility: CLINIC | Age: 89
End: 2025-01-15

## 2025-01-15 NOTE — TELEPHONE ENCOUNTER
Patient stated that she had some dry blood above her ZIO patch, pt denies itching or pain. Patient stated she thinks she might have knicked her self while doing the prep. Called [facility], [facility]ba said that pt can call in and have a status check done on patch. Patient was given [facility] number to call in.

## 2025-01-15 NOTE — TELEPHONE ENCOUNTER
Received call from pt.  She contacted [facility].  They told her the Zio monitor was only ordered for 2 days, so she has to remove it.  Pt says she was told at visit that she should wear it for 2 weeks. Pt would like to know what Luke would like her to do. Pt hesitant to apply another monitor due to skin reaction to the patch.    Please advise.

## 2025-01-24 LAB
CV ZIO BASELINE AVG BPM: 70 BPM
CV ZIO BASELINE BPM HIGH: 116 BPM
CV ZIO BASELINE BPM LOW: 55 BPM
CV ZIO DEVICE ANALYSIS TIME: NORMAL
CV ZIO ECT SVE COUNT: NORMAL EPISODES
CV ZIO ECT SVE CPLT COUNT: 91 EPISODES
CV ZIO ECT SVE CPLT FREQ: NORMAL
CV ZIO ECT SVE FREQ: NORMAL
CV ZIO ECT SVE TPLT COUNT: 31 EPISODES
CV ZIO ECT SVE TPLT FREQ: NORMAL
CV ZIO ECT VE COUNT: 117 EPISODES
CV ZIO ECT VE CPLT COUNT: 0 EPISODES
CV ZIO ECT VE CPLT FREQ: 0
CV ZIO ECT VE FREQ: NORMAL
CV ZIO ECT VE TPLT COUNT: 0 EPISODES
CV ZIO ECT VE TPLT FREQ: 0
CV ZIO ECTOPIC SVE COUPLET RAW PERCENT: 0.07 %
CV ZIO ECTOPIC SVE ISOLATED PERCENT: 4.19 %
CV ZIO ECTOPIC SVE TRIPLET RAW PERCENT: 0.04 %
CV ZIO ECTOPIC VE COUPLET RAW PERCENT: 0 %
CV ZIO ECTOPIC VE ISOLATED PERCENT: 0.04 %
CV ZIO ECTOPIC VE TRIPLET RAW PERCENT: 0 %
CV ZIO ENROLLMENT END: NORMAL
CV ZIO ENROLLMENT START: NORMAL
CV ZIO PATIENT EVENTS DIARIES: 0
CV ZIO PATIENT EVENTS TRIGGERS: 0
CV ZIO PAUSE COUNT: 0
CV ZIO PRESCRIPTION STATUS: NORMAL
CV ZIO SVT AVG BPM: 106 BPM
CV ZIO SVT BPM HIGH: 116 BPM
CV ZIO SVT BPM LOW: 92 BPM
CV ZIO SVT COUNT: 3
CV ZIO SVT F EPI AVG BPM: 108 BPM
CV ZIO SVT F EPI BEATS: 4 BEATS
CV ZIO SVT F EPI BPM HIGH: 116 BPM
CV ZIO SVT F EPI BPM LOW: 103 BPM
CV ZIO SVT F EPI DUR: 2 SEC
CV ZIO SVT F EPI END: NORMAL
CV ZIO SVT F EPI START: NORMAL
CV ZIO SVT L EPI AVG BPM: 110 BPM
CV ZIO SVT L EPI BEATS: 14 BEATS
CV ZIO SVT L EPI BPM HIGH: 112 BPM
CV ZIO SVT L EPI BPM LOW: 108 BPM
CV ZIO SVT L EPI DUR: 7.4 SEC
CV ZIO SVT L EPI END: NORMAL
CV ZIO SVT L EPI START: NORMAL
CV ZIO TOTAL  ENROLLMENT PERIOD: NORMAL
CV ZIO VT COUNT: 0

## 2025-01-27 ENCOUNTER — TELEPHONE (OUTPATIENT)
Dept: CARDIOLOGY CLINIC | Facility: CLINIC | Age: 89
End: 2025-01-27

## 2025-01-27 NOTE — TELEPHONE ENCOUNTER
Called pt to let her know to get an updated BMP to reassess her kidney function, and that there's an order in her chart for it so she can go to any [facility] lab to get it done. Pt understood. Informed pt to fast for 8 hours prior to testing. Pt understood. No questions at this time.

## 2025-02-13 ENCOUNTER — TELEPHONE (OUTPATIENT)
Age: 89
End: 2025-02-13

## 2025-02-28 DIAGNOSIS — R00.2 PALPITATIONS: ICD-10-CM

## 2025-02-28 RX ORDER — METOPROLOL SUCCINATE 25 MG/1
25 TABLET, EXTENDED RELEASE ORAL 2 TIMES DAILY
Qty: 90 TABLET | Refills: 1 | Status: SHIPPED | OUTPATIENT
Start: 2025-02-28 | End: 2025-02-28

## 2025-02-28 RX ORDER — METOPROLOL SUCCINATE 25 MG/1
TABLET, EXTENDED RELEASE ORAL
Qty: 180 TABLET | Refills: 1 | Status: SHIPPED | OUTPATIENT
Start: 2025-02-28

## 2025-02-28 NOTE — TELEPHONE ENCOUNTER
metoprolol succinate (TOPROL-XL) 25 mg 24 hr tablet-: Take 1 tablet (25 mg total) by mouth 2 (two) times a day TAKE 1 TABLET IN THE MORNING AND TAKE 2 TABLETS IN THE EVENING    [facility] AV     Pt has enough for 3 days     Christian Black MD-[facility]

## 2025-03-24 ENCOUNTER — RESULTS FOLLOW-UP (OUTPATIENT)
Dept: CARDIOLOGY CLINIC | Facility: CLINIC | Age: 89
End: 2025-03-24

## 2025-05-24 DIAGNOSIS — R00.2 PALPITATIONS: ICD-10-CM

## 2025-05-25 RX ORDER — METOPROLOL SUCCINATE 25 MG/1
TABLET, EXTENDED RELEASE ORAL
Qty: 270 TABLET | Refills: 1 | Status: SHIPPED | OUTPATIENT
Start: 2025-05-25

## (undated) DEVICE — SPONGE PVP SCRUB WING STERILE

## (undated) DEVICE — PLUMEPEN PRO 10FT

## (undated) DEVICE — INTENDED FOR TISSUE SEPARATION, AND OTHER PROCEDURES THAT REQUIRE A SHARP SURGICAL BLADE TO PUNCTURE OR CUT.: Brand: BARD-PARKER SAFETY BLADES SIZE 10, STERILE

## (undated) DEVICE — STOCKINETTE REGULAR

## (undated) DEVICE — CUFF TOURNIQUET 34 X 4 IN QUICK CONNECT DISP 1BLA

## (undated) DEVICE — PADDING CAST 4 IN  COTTON STRL

## (undated) DEVICE — CHLORAPREP HI-LITE 26ML ORANGE

## (undated) DEVICE — GAUZE SPONGES,16 PLY: Brand: CURITY

## (undated) DEVICE — DRAPE SHEET X-LG

## (undated) DEVICE — SPINNING CEMENT MIXING BOWL

## (undated) DEVICE — NO-SCRATCH ™ SMALL WHITNEY CURETTE ™ IS A SINGLE-USE, PLASTIC CURETTE FOR QUICKLY APPLYING, MANIPULATING AND REMOVING BONE CEMENT DURING HIP AND KNEE REPLACEMENT SURGERY. THE PLASTIC IS SOFTER THAN STEEL INSTRUMENTS, REDUCING THE RISK OF DAMAGING THE PROSTHESIS WITH METAL INSTRUMENTS.  THE CURETTE’S 6MM TIP REMOVES EXCESS CEMENT FROM REPLACEMENT HIPS AND KNEES. EASY-TO-MANEUVER, THE SMALL BLUE CURETTE LETS YOU REMOVE CEMENT FROM ALL EDGES OF THE PROSTHESIS.NO-SCRATCH WHITNEY SMALL CURETTE FEATURES:SAFER THAN STEEL- MADE OF PLASTIC - STURDY YET SOFTER THAN SURGICAL STEEL.HANDIER- EACH TOOL HAS A MOLDED-IN THUMB INDENTATION INSTANTLY ORIENTING THE TOOL.- EASIER TO MANEUVER IN HARD TO SEE PLACES.- COLOR-CODED FOR EASY IDENTIFICATION.FASTER- COMES INDIVIDUALLY PACKAGED IN STERILE, PEEL OPEN POUCH, READY TO GO.- APPLIES, MANIPULATES, OR REMOVES CEMENT WITH FINGERTIP PRECISION.ECONOMICAL- THE COST OF A SINGLE REVISION DWARFS THE COST OF A SINGLE-USE CURETTE. - DISPOSABLE – THERE’S NO NEED TO WASTE TIME REMOVING HARDENED CEMENT OR RE-STERILIZING TOOLS.- LESS EXPENSIVE TO BUY AND INVENTORY - ORDER ONLY THE TOOL YOU USE.- PACKAGED 25 INDIVIDUALLY WRAPPED TOOLS TO A CARTON FOR CONVENIENT SHELF STORAGE.: Brand: WHITNEY NO-SCRATCH CURETTE (SMALL)

## (undated) DEVICE — GLOVE INDICATOR PI UNDERGLOVE SZ 8.5 BLUE

## (undated) DEVICE — GLOVE SRG BIOGEL 8

## (undated) DEVICE — CAPIT KNEE ATTUNE FB CEMENT - DEPUY

## (undated) DEVICE — 3M™ IOBAN™ 2 ANTIMICROBIAL INCISE DRAPE 6650EZ: Brand: IOBAN™ 2

## (undated) DEVICE — PACK TOTAL KNEE PBDS

## (undated) DEVICE — ACE WRAP 6 IN UNSTERILE

## (undated) DEVICE — SUT VICRYL PLUS 1 CTB-1 36 IN VCPB947H

## (undated) DEVICE — SPONGE GAUZE 4 X 9

## (undated) DEVICE — SILVER-COATED ANTIMICROBIAL BARRIER DRESSING: Brand: ACTICOAT   4" X 8"

## (undated) DEVICE — JP 3-SPRING RES W/10FR PVC DRAIN/TR: Brand: CARDINAL HEALTH

## (undated) DEVICE — HOOD: Brand: FLYTE, SURGICOOL

## (undated) DEVICE — REM POLYHESIVE ADULT PATIENT RETURN ELECTRODE: Brand: VALLEYLAB

## (undated) DEVICE — DUAL CUT SAGITTAL BLADE

## (undated) DEVICE — THE SIMPULSE SOLO SYSTEM WITH ULTREX RETRACTABLE SPLASH SHIELD TIP: Brand: SIMPULSE SOLO

## (undated) DEVICE — COOL TEMP PAD

## (undated) DEVICE — RECIPROCATING BLADE, DOUBLE SIDED (70.0 X 0.96 X 12.5MM)

## (undated) DEVICE — ABDOMINAL PAD: Brand: DERMACEA

## (undated) DEVICE — ACE WRAP 6 IN STERILE

## (undated) DEVICE — SUT VICRYL PLUS 2-0 CTB-1 27 IN VCPB259H

## (undated) DEVICE — TRAY FOLEY 16FR URIMETER SURESTEP